# Patient Record
Sex: FEMALE | Race: WHITE | Employment: UNEMPLOYED | URBAN - METROPOLITAN AREA
[De-identification: names, ages, dates, MRNs, and addresses within clinical notes are randomized per-mention and may not be internally consistent; named-entity substitution may affect disease eponyms.]

---

## 2018-02-26 PROBLEM — D64.9 LOW HEMOGLOBIN: Status: ACTIVE | Noted: 2018-02-26

## 2018-03-19 ENCOUNTER — OFFICE VISIT (OUTPATIENT)
Dept: FAMILY MEDICINE CLINIC | Age: 23
End: 2018-03-19
Payer: MEDICAID

## 2018-03-19 VITALS
OXYGEN SATURATION: 99 % | BODY MASS INDEX: 27.46 KG/M2 | WEIGHT: 155 LBS | RESPIRATION RATE: 16 BRPM | SYSTOLIC BLOOD PRESSURE: 131 MMHG | DIASTOLIC BLOOD PRESSURE: 78 MMHG | HEART RATE: 62 BPM | HEIGHT: 63 IN

## 2018-03-19 DIAGNOSIS — J32.9 CHRONIC SINUSITIS, UNSPECIFIED LOCATION: ICD-10-CM

## 2018-03-19 PROBLEM — D64.9 LOW HEMOGLOBIN: Status: RESOLVED | Noted: 2018-02-26 | Resolved: 2018-03-19

## 2018-03-19 PROCEDURE — 1036F TOBACCO NON-USER: CPT | Performed by: FAMILY MEDICINE

## 2018-03-19 PROCEDURE — G8484 FLU IMMUNIZE NO ADMIN: HCPCS | Performed by: FAMILY MEDICINE

## 2018-03-19 PROCEDURE — G8419 CALC BMI OUT NRM PARAM NOF/U: HCPCS | Performed by: FAMILY MEDICINE

## 2018-03-19 PROCEDURE — 99213 OFFICE O/P EST LOW 20 MIN: CPT | Performed by: FAMILY MEDICINE

## 2018-03-19 PROCEDURE — G8427 DOCREV CUR MEDS BY ELIG CLIN: HCPCS | Performed by: FAMILY MEDICINE

## 2018-03-19 RX ORDER — AZELASTINE 1 MG/ML
2 SPRAY, METERED NASAL 2 TIMES DAILY
Qty: 1 BOTTLE | Refills: 3 | Status: SHIPPED | OUTPATIENT
Start: 2018-03-19 | End: 2019-03-02 | Stop reason: ALTCHOICE

## 2018-03-19 RX ORDER — LORATADINE 10 MG/1
10 CAPSULE, LIQUID FILLED ORAL DAILY
Qty: 30 CAPSULE | Refills: 3 | Status: SHIPPED | OUTPATIENT
Start: 2018-03-19 | End: 2019-03-02 | Stop reason: ALTCHOICE

## 2018-03-19 RX ORDER — FLUTICASONE PROPIONATE 50 MCG
1 SPRAY, SUSPENSION (ML) NASAL DAILY
Qty: 1 BOTTLE | Refills: 3 | Status: CANCELLED | OUTPATIENT
Start: 2018-03-19

## 2018-03-19 ASSESSMENT — ENCOUNTER SYMPTOMS
SHORTNESS OF BREATH: 0
COUGH: 1
ABDOMINAL PAIN: 0
BLURRED VISION: 0
DIARRHEA: 0
HEARTBURN: 0
SINUS PAIN: 1
VOMITING: 0

## 2018-03-19 ASSESSMENT — PATIENT HEALTH QUESTIONNAIRE - PHQ9
SUM OF ALL RESPONSES TO PHQ QUESTIONS 1-9: 0
1. LITTLE INTEREST OR PLEASURE IN DOING THINGS: 0
2. FEELING DOWN, DEPRESSED OR HOPELESS: 0
SUM OF ALL RESPONSES TO PHQ9 QUESTIONS 1 & 2: 0

## 2018-03-19 NOTE — PROGRESS NOTES
SUBJECTIVE:        Patient ID: Mega Vallejo is a 23 y.o. female who presents for   Chief Complaint   Patient presents with    Results    Sinus Problem       Follow up on results/Chronic Sinus Infections  Discussed with pt CT sinus, Patient reported that she has an appointment with ENT in mid May for follow-up  Levaquin for 10 days, she added that she Felt better for 2 week, although she continued to have drainage  Over the last couple of days, patient reported that she has some dry cough, congestion of her sinuses and feels drainage on the back of her throat , her nose starts running too  No ear pain, no drainage  No chest pain, no abdominal pain  Allergy medication, takes Claritin daily - at night  No fever, chills, N/V/D    Review of Systems   Constitutional: Negative for chills and fever. HENT: Positive for congestion and sinus pain. Negative for ear discharge and ear pain. Eyes: Negative for blurred vision. Respiratory: Positive for cough. Negative for shortness of breath. Cardiovascular: Negative for chest pain. Gastrointestinal: Negative for abdominal pain, diarrhea, heartburn and vomiting. Skin: Negative for rash. The patient has no complaints, no CP, SOB, N/V/D, headache, dizziness, or vision change. History reviewed. No pertinent past medical history. Patient Active Problem List   Diagnosis    Depression    Anxiety    Chronic sinusitis       History reviewed. No pertinent surgical history. History reviewed. No pertinent past medical history. History reviewed. No pertinent family history.     Social History     Social History    Marital status: Single     Spouse name: N/A    Number of children: N/A    Years of education: N/A     Social History Main Topics    Smoking status: Never Smoker    Smokeless tobacco: Never Used    Alcohol use No    Drug use: No    Sexual activity: Yes     Partners: Male     Other Topics Concern    None     Social History Narrative schedule as soon as possible if overdue, as this is important in assessing for undiagnosed pathology, especially cancer, as well as protecting against potentially harmful/life threatening disease. Refills as needed    Avani received counseling on the following healthy behaviors: medication adherence    Discussed any signs and symptoms that would warrant immediate ED evaluation    Avani was instructed to call if any new symptoms develop prior to next visit.          F/U as instructed    Parvin Connors M.D  PGY-2  Family Medicine

## 2018-04-08 ENCOUNTER — OFFICE VISIT (OUTPATIENT)
Dept: FAMILY MEDICINE CLINIC | Age: 23
End: 2018-04-08
Payer: MEDICAID

## 2018-04-08 VITALS
BODY MASS INDEX: 27.82 KG/M2 | WEIGHT: 157 LBS | HEART RATE: 95 BPM | SYSTOLIC BLOOD PRESSURE: 108 MMHG | TEMPERATURE: 98.8 F | OXYGEN SATURATION: 99 % | RESPIRATION RATE: 18 BRPM | DIASTOLIC BLOOD PRESSURE: 70 MMHG | HEIGHT: 63 IN

## 2018-04-08 DIAGNOSIS — J01.40 ACUTE NON-RECURRENT PANSINUSITIS: Primary | ICD-10-CM

## 2018-04-08 PROCEDURE — 99213 OFFICE O/P EST LOW 20 MIN: CPT | Performed by: PHYSICIAN ASSISTANT

## 2018-04-08 PROCEDURE — G8419 CALC BMI OUT NRM PARAM NOF/U: HCPCS | Performed by: PHYSICIAN ASSISTANT

## 2018-04-08 PROCEDURE — G8427 DOCREV CUR MEDS BY ELIG CLIN: HCPCS | Performed by: PHYSICIAN ASSISTANT

## 2018-04-08 PROCEDURE — 1036F TOBACCO NON-USER: CPT | Performed by: PHYSICIAN ASSISTANT

## 2018-04-08 RX ORDER — BROMPHENIRAMINE MALEATE, PSEUDOEPHEDRINE HYDROCHLORIDE, AND DEXTROMETHORPHAN HYDROBROMIDE 2; 30; 10 MG/5ML; MG/5ML; MG/5ML
10 SYRUP ORAL 4 TIMES DAILY PRN
Qty: 120 ML | Refills: 0 | Status: SHIPPED | OUTPATIENT
Start: 2018-04-08 | End: 2018-07-03 | Stop reason: ALTCHOICE

## 2018-04-08 RX ORDER — AMOXICILLIN AND CLAVULANATE POTASSIUM 875; 125 MG/1; MG/1
1 TABLET, FILM COATED ORAL 2 TIMES DAILY
Qty: 20 TABLET | Refills: 0 | Status: SHIPPED | OUTPATIENT
Start: 2018-04-08 | End: 2018-04-13 | Stop reason: ALTCHOICE

## 2018-04-09 ENCOUNTER — TELEPHONE (OUTPATIENT)
Dept: FAMILY MEDICINE CLINIC | Age: 23
End: 2018-04-09

## 2018-04-10 ENCOUNTER — TELEPHONE (OUTPATIENT)
Dept: FAMILY MEDICINE CLINIC | Age: 23
End: 2018-04-10

## 2018-04-11 RX ORDER — FLUCONAZOLE 150 MG/1
150 TABLET ORAL ONCE
Qty: 1 TABLET | Refills: 1 | Status: SHIPPED | OUTPATIENT
Start: 2018-04-11 | End: 2018-04-11

## 2018-04-13 ENCOUNTER — OFFICE VISIT (OUTPATIENT)
Dept: FAMILY MEDICINE CLINIC | Age: 23
End: 2018-04-13
Payer: MEDICAID

## 2018-04-13 VITALS
WEIGHT: 159 LBS | HEIGHT: 63 IN | BODY MASS INDEX: 28.17 KG/M2 | HEART RATE: 65 BPM | RESPIRATION RATE: 16 BRPM | SYSTOLIC BLOOD PRESSURE: 119 MMHG | DIASTOLIC BLOOD PRESSURE: 81 MMHG | TEMPERATURE: 98.3 F

## 2018-04-13 DIAGNOSIS — J01.91 ACUTE RECURRENT SINUSITIS, UNSPECIFIED LOCATION: Primary | ICD-10-CM

## 2018-04-13 PROCEDURE — 1036F TOBACCO NON-USER: CPT | Performed by: FAMILY MEDICINE

## 2018-04-13 PROCEDURE — G8427 DOCREV CUR MEDS BY ELIG CLIN: HCPCS | Performed by: FAMILY MEDICINE

## 2018-04-13 PROCEDURE — 99213 OFFICE O/P EST LOW 20 MIN: CPT | Performed by: FAMILY MEDICINE

## 2018-04-13 PROCEDURE — G8419 CALC BMI OUT NRM PARAM NOF/U: HCPCS | Performed by: FAMILY MEDICINE

## 2018-04-13 RX ORDER — IBUPROFEN 800 MG/1
TABLET ORAL
Refills: 2 | COMMUNITY
Start: 2018-03-19 | End: 2019-03-02 | Stop reason: ALTCHOICE

## 2018-04-13 RX ORDER — FLUCONAZOLE 150 MG/1
150 TABLET ORAL ONCE
Qty: 2 TABLET | Refills: 0 | Status: SHIPPED | OUTPATIENT
Start: 2018-04-13 | End: 2018-04-13

## 2018-04-13 RX ORDER — MOXIFLOXACIN HYDROCHLORIDE 400 MG/1
400 TABLET ORAL DAILY
Qty: 14 TABLET | Refills: 0 | Status: SHIPPED | OUTPATIENT
Start: 2018-04-13 | End: 2018-04-14 | Stop reason: ALTCHOICE

## 2018-04-13 ASSESSMENT — ENCOUNTER SYMPTOMS
HEARTBURN: 0
SHORTNESS OF BREATH: 0
VOMITING: 0
NAUSEA: 0
COUGH: 1
SPUTUM PRODUCTION: 0
EYE PAIN: 1
ABDOMINAL PAIN: 0
SORE THROAT: 1
SINUS PAIN: 1
EYE DISCHARGE: 1
DIARRHEA: 0

## 2018-04-14 DIAGNOSIS — J01.91 ACUTE RECURRENT SINUSITIS, UNSPECIFIED LOCATION: Primary | ICD-10-CM

## 2018-04-14 RX ORDER — CIPROFLOXACIN 500 MG/1
500 TABLET, FILM COATED ORAL 2 TIMES DAILY
Qty: 14 TABLET | Refills: 0 | Status: SHIPPED | OUTPATIENT
Start: 2018-04-14 | End: 2018-04-24

## 2018-04-30 ENCOUNTER — OFFICE VISIT (OUTPATIENT)
Dept: FAMILY MEDICINE CLINIC | Age: 23
End: 2018-04-30
Payer: MEDICAID

## 2018-04-30 VITALS
BODY MASS INDEX: 28.17 KG/M2 | WEIGHT: 159 LBS | SYSTOLIC BLOOD PRESSURE: 122 MMHG | OXYGEN SATURATION: 99 % | TEMPERATURE: 98.3 F | HEART RATE: 66 BPM | HEIGHT: 63 IN | DIASTOLIC BLOOD PRESSURE: 80 MMHG

## 2018-04-30 DIAGNOSIS — J01.90 ACUTE SINUSITIS, RECURRENCE NOT SPECIFIED, UNSPECIFIED LOCATION: Primary | ICD-10-CM

## 2018-04-30 LAB — S PYO AG THROAT QL: NORMAL

## 2018-04-30 PROCEDURE — 99213 OFFICE O/P EST LOW 20 MIN: CPT | Performed by: PHYSICIAN ASSISTANT

## 2018-04-30 PROCEDURE — G8419 CALC BMI OUT NRM PARAM NOF/U: HCPCS | Performed by: PHYSICIAN ASSISTANT

## 2018-04-30 PROCEDURE — 1036F TOBACCO NON-USER: CPT | Performed by: PHYSICIAN ASSISTANT

## 2018-04-30 PROCEDURE — G8427 DOCREV CUR MEDS BY ELIG CLIN: HCPCS | Performed by: PHYSICIAN ASSISTANT

## 2018-04-30 PROCEDURE — 87880 STREP A ASSAY W/OPTIC: CPT | Performed by: PHYSICIAN ASSISTANT

## 2018-04-30 RX ORDER — LORATADINE 10 MG/1
10 TABLET ORAL DAILY
Qty: 10 TABLET | Refills: 0 | Status: SHIPPED | OUTPATIENT
Start: 2018-04-30 | End: 2018-05-10

## 2018-04-30 RX ORDER — CEFDINIR 300 MG/1
300 CAPSULE ORAL 2 TIMES DAILY
Qty: 20 CAPSULE | Refills: 0 | Status: SHIPPED | OUTPATIENT
Start: 2018-04-30 | End: 2018-05-10

## 2018-04-30 RX ORDER — METHYLPREDNISOLONE 4 MG/1
TABLET ORAL
Qty: 1 KIT | Refills: 0 | Status: SHIPPED | OUTPATIENT
Start: 2018-04-30 | End: 2018-05-06

## 2018-05-02 ENCOUNTER — TELEPHONE (OUTPATIENT)
Dept: FAMILY MEDICINE CLINIC | Age: 23
End: 2018-05-02

## 2018-05-11 ENCOUNTER — OFFICE VISIT (OUTPATIENT)
Dept: ENT CLINIC | Age: 23
End: 2018-05-11
Payer: MEDICAID

## 2018-05-11 VITALS
HEIGHT: 63 IN | SYSTOLIC BLOOD PRESSURE: 119 MMHG | HEART RATE: 66 BPM | BODY MASS INDEX: 25.69 KG/M2 | TEMPERATURE: 99 F | WEIGHT: 145 LBS | DIASTOLIC BLOOD PRESSURE: 71 MMHG | OXYGEN SATURATION: 99 %

## 2018-05-11 DIAGNOSIS — J30.1 CHRONIC SEASONAL ALLERGIC RHINITIS DUE TO POLLEN: ICD-10-CM

## 2018-05-11 DIAGNOSIS — J32.9 CHRONIC SINUSITIS, UNSPECIFIED LOCATION: Primary | ICD-10-CM

## 2018-05-11 PROCEDURE — G8419 CALC BMI OUT NRM PARAM NOF/U: HCPCS | Performed by: OTOLARYNGOLOGY

## 2018-05-11 PROCEDURE — 1036F TOBACCO NON-USER: CPT | Performed by: OTOLARYNGOLOGY

## 2018-05-11 PROCEDURE — 99204 OFFICE O/P NEW MOD 45 MIN: CPT | Performed by: OTOLARYNGOLOGY

## 2018-05-11 PROCEDURE — G8427 DOCREV CUR MEDS BY ELIG CLIN: HCPCS | Performed by: OTOLARYNGOLOGY

## 2018-05-11 ASSESSMENT — ENCOUNTER SYMPTOMS
SHORTNESS OF BREATH: 0
ABDOMINAL PAIN: 0
COLOR CHANGE: 0
GASTROINTESTINAL NEGATIVE: 1
SINUS PRESSURE: 1
RESPIRATORY NEGATIVE: 1
EYES NEGATIVE: 1
RHINORRHEA: 1

## 2018-05-15 ENCOUNTER — TELEPHONE (OUTPATIENT)
Dept: ENT CLINIC | Age: 23
End: 2018-05-15

## 2018-06-26 ENCOUNTER — TELEPHONE (OUTPATIENT)
Dept: ENT CLINIC | Age: 23
End: 2018-06-26

## 2018-07-03 NOTE — PROGRESS NOTES
[facility] PRE-ADMISSION TESTING INSTRUCTIONS    The Preadmission Testing patient is instructed accordingly using the following criteria (check applicable):    ARRIVAL INSTRUCTIONS:  [x] Parking the day of Surgery is located in the Main Entrance lot. Upon entering the door, make an immediate right to the surgery reception desk    [x] Bring photo ID and insurance card    [] Bring in a copy of Living will or Durable Power of  papers. [x] Please be sure to arrange transportation to and from the hospital    [x] Please arrange for someone to be with you the remainder of the day due to having anesthesia      GENERAL INSTRUCTIONS:    [x] Nothing by mouth after midnight, including gum, candy, mints or water    [x] You may brush your teeth, but do not swallow any water    [x] Take medications as instructed with 1-2 oz of water    [x] Stop herbal supplements and vitamins 5 days prior to procedure    [x] Follow preop dosing of blood thinners per physician instructions    [] Do not take insulin or oral diabetic medications    [] If diabetic and have low blood sugar or feel symptomatic, take 1-2oz apple juice or glucose tablets    [] Bring inhalers day of surgery    [] Bring C-PAP/ Bi-Pap day of surgery    [x] Bring urine specimen day of surgery    [x] Antibacterial Soap shower or bath AM of Surgery, no lotion, powders or creams to surgical site    [] Follow bowel prep as instructed per surgeon    [x] No tobacco products within 24 hours of surgery     [x] No alcohol or illegal drug use within 24 hours of surgery.     [x] Jewelry, body piercing's, eyeglasses, contact lenses and dentures are not permitted into surgery (bring cases)      [x] Please do not wear any nail polish or make up on the day of surgery    [x] If not already done, you can expect a call from registration    [x] If surgeon requests a time change you will be notified the day prior to surgery    [x] If you receive a survey

## 2018-07-16 ENCOUNTER — ANESTHESIA (OUTPATIENT)
Dept: OPERATING ROOM | Age: 23
End: 2018-07-16
Payer: MEDICAID

## 2018-07-16 ENCOUNTER — ANESTHESIA EVENT (OUTPATIENT)
Dept: OPERATING ROOM | Age: 23
End: 2018-07-16
Payer: MEDICAID

## 2018-07-16 ENCOUNTER — HOSPITAL ENCOUNTER (OUTPATIENT)
Age: 23
Setting detail: OUTPATIENT SURGERY
Discharge: HOME | End: 2018-07-16
Attending: OTOLARYNGOLOGY | Admitting: OTOLARYNGOLOGY
Payer: MEDICAID

## 2018-07-16 VITALS
HEART RATE: 60 BPM | BODY MASS INDEX: 26.58 KG/M2 | TEMPERATURE: 97.4 F | DIASTOLIC BLOOD PRESSURE: 78 MMHG | HEIGHT: 63 IN | SYSTOLIC BLOOD PRESSURE: 124 MMHG | WEIGHT: 150 LBS | RESPIRATION RATE: 20 BRPM | OXYGEN SATURATION: 94 %

## 2018-07-16 VITALS
RESPIRATION RATE: 1 BRPM | TEMPERATURE: 92.5 F | DIASTOLIC BLOOD PRESSURE: 63 MMHG | SYSTOLIC BLOOD PRESSURE: 116 MMHG | OXYGEN SATURATION: 100 %

## 2018-07-16 DIAGNOSIS — J32.9 CHRONIC SINUSITIS, UNSPECIFIED LOCATION: Primary | ICD-10-CM

## 2018-07-16 LAB — HCG(URINE) PREGNANCY TEST: NEGATIVE

## 2018-07-16 PROCEDURE — 3700000001 HC ADD 15 MINUTES (ANESTHESIA): Performed by: OTOLARYNGOLOGY

## 2018-07-16 PROCEDURE — 6370000000 HC RX 637 (ALT 250 FOR IP): Performed by: ANESTHESIOLOGY

## 2018-07-16 PROCEDURE — 7100000000 HC PACU RECOVERY - FIRST 15 MIN: Performed by: OTOLARYNGOLOGY

## 2018-07-16 PROCEDURE — 3700000000 HC ANESTHESIA ATTENDED CARE: Performed by: OTOLARYNGOLOGY

## 2018-07-16 PROCEDURE — 2580000003 HC RX 258: Performed by: NURSE ANESTHETIST, CERTIFIED REGISTERED

## 2018-07-16 PROCEDURE — 2709999900 HC NON-CHARGEABLE SUPPLY: Performed by: OTOLARYNGOLOGY

## 2018-07-16 PROCEDURE — C2625 STENT, NON-COR, TEM W/DEL SY: HCPCS | Performed by: OTOLARYNGOLOGY

## 2018-07-16 PROCEDURE — 6370000000 HC RX 637 (ALT 250 FOR IP): Performed by: OTOLARYNGOLOGY

## 2018-07-16 PROCEDURE — 30140 RESECT INFERIOR TURBINATE: CPT | Performed by: OTOLARYNGOLOGY

## 2018-07-16 PROCEDURE — 6360000002 HC RX W HCPCS: Performed by: NURSE ANESTHETIST, CERTIFIED REGISTERED

## 2018-07-16 PROCEDURE — 31295 NSL/SINS NDSC SURG MAX SINS: CPT | Performed by: OTOLARYNGOLOGY

## 2018-07-16 PROCEDURE — 3600000003 HC SURGERY LEVEL 3 BASE: Performed by: OTOLARYNGOLOGY

## 2018-07-16 PROCEDURE — 2580000003 HC RX 258: Performed by: ANESTHESIOLOGY

## 2018-07-16 PROCEDURE — 3600000013 HC SURGERY LEVEL 3 ADDTL 15MIN: Performed by: OTOLARYNGOLOGY

## 2018-07-16 PROCEDURE — 7100000010 HC PHASE II RECOVERY - FIRST 15 MIN: Performed by: OTOLARYNGOLOGY

## 2018-07-16 PROCEDURE — 2720000010 HC SURG SUPPLY STERILE: Performed by: OTOLARYNGOLOGY

## 2018-07-16 PROCEDURE — 2500000003 HC RX 250 WO HCPCS: Performed by: NURSE ANESTHETIST, CERTIFIED REGISTERED

## 2018-07-16 PROCEDURE — 6360000002 HC RX W HCPCS: Performed by: ANESTHESIOLOGY

## 2018-07-16 PROCEDURE — 2500000003 HC RX 250 WO HCPCS: Performed by: OTOLARYNGOLOGY

## 2018-07-16 PROCEDURE — 7100000001 HC PACU RECOVERY - ADDTL 15 MIN: Performed by: OTOLARYNGOLOGY

## 2018-07-16 PROCEDURE — 81025 URINE PREGNANCY TEST: CPT

## 2018-07-16 PROCEDURE — C1776 JOINT DEVICE (IMPLANTABLE): HCPCS | Performed by: OTOLARYNGOLOGY

## 2018-07-16 PROCEDURE — 7100000011 HC PHASE II RECOVERY - ADDTL 15 MIN: Performed by: OTOLARYNGOLOGY

## 2018-07-16 PROCEDURE — C1726 CATH, BAL DIL, NON-VASCULAR: HCPCS | Performed by: OTOLARYNGOLOGY

## 2018-07-16 PROCEDURE — 31298 NSL/SINS NDSC SURG FRNT&SPHN: CPT | Performed by: OTOLARYNGOLOGY

## 2018-07-16 DEVICE — PROPEL CONTOUR SINUS IMPLANT
Type: IMPLANTABLE DEVICE | Site: NOSE | Status: FUNCTIONAL
Brand: PROPEL CONTOUR

## 2018-07-16 DEVICE — PROPEL MINI SINUS IMPLANT
Type: IMPLANTABLE DEVICE | Site: NOSE | Status: FUNCTIONAL
Brand: PROPEL MINI

## 2018-07-16 DEVICE — NASASTENT
Type: IMPLANTABLE DEVICE | Site: NOSE | Status: FUNCTIONAL
Brand: RAPID RHINO

## 2018-07-16 RX ORDER — ONDANSETRON 2 MG/ML
4 INJECTION INTRAMUSCULAR; INTRAVENOUS
Status: DISCONTINUED | OUTPATIENT
Start: 2018-07-16 | End: 2018-07-16 | Stop reason: HOSPADM

## 2018-07-16 RX ORDER — ONDANSETRON 2 MG/ML
INJECTION INTRAMUSCULAR; INTRAVENOUS PRN
Status: DISCONTINUED | OUTPATIENT
Start: 2018-07-16 | End: 2018-07-16 | Stop reason: SDUPTHER

## 2018-07-16 RX ORDER — HYDROCODONE BITARTRATE AND ACETAMINOPHEN 5; 325 MG/1; MG/1
1 TABLET ORAL EVERY 6 HOURS PRN
Qty: 20 TABLET | Refills: 0 | Status: SHIPPED | OUTPATIENT
Start: 2018-07-16 | End: 2018-07-21

## 2018-07-16 RX ORDER — DEXAMETHASONE SODIUM PHOSPHATE 4 MG/ML
INJECTION, SOLUTION INTRA-ARTICULAR; INTRALESIONAL; INTRAMUSCULAR; INTRAVENOUS; SOFT TISSUE PRN
Status: DISCONTINUED | OUTPATIENT
Start: 2018-07-16 | End: 2018-07-16 | Stop reason: SDUPTHER

## 2018-07-16 RX ORDER — SODIUM CHLORIDE 0.9 % (FLUSH) 0.9 %
10 SYRINGE (ML) INJECTION EVERY 12 HOURS SCHEDULED
Status: DISCONTINUED | OUTPATIENT
Start: 2018-07-16 | End: 2018-07-16 | Stop reason: HOSPADM

## 2018-07-16 RX ORDER — LIDOCAINE HYDROCHLORIDE 20 MG/ML
INJECTION, SOLUTION INFILTRATION; PERINEURAL PRN
Status: DISCONTINUED | OUTPATIENT
Start: 2018-07-16 | End: 2018-07-16 | Stop reason: SDUPTHER

## 2018-07-16 RX ORDER — OXYMETAZOLINE HYDROCHLORIDE 0.05 G/100ML
2 SPRAY NASAL PRN
Status: COMPLETED | OUTPATIENT
Start: 2018-07-16 | End: 2018-07-16

## 2018-07-16 RX ORDER — ROCURONIUM BROMIDE 10 MG/ML
INJECTION, SOLUTION INTRAVENOUS PRN
Status: DISCONTINUED | OUTPATIENT
Start: 2018-07-16 | End: 2018-07-16 | Stop reason: SDUPTHER

## 2018-07-16 RX ORDER — FENTANYL CITRATE 50 UG/ML
INJECTION, SOLUTION INTRAMUSCULAR; INTRAVENOUS PRN
Status: DISCONTINUED | OUTPATIENT
Start: 2018-07-16 | End: 2018-07-16 | Stop reason: SDUPTHER

## 2018-07-16 RX ORDER — SODIUM CHLORIDE 0.9 % (FLUSH) 0.9 %
10 SYRINGE (ML) INJECTION PRN
Status: DISCONTINUED | OUTPATIENT
Start: 2018-07-16 | End: 2018-07-16 | Stop reason: HOSPADM

## 2018-07-16 RX ORDER — HYDROCODONE BITARTRATE AND ACETAMINOPHEN 5; 325 MG/1; MG/1
1 TABLET ORAL ONCE
Status: COMPLETED | OUTPATIENT
Start: 2018-07-16 | End: 2018-07-16

## 2018-07-16 RX ORDER — SODIUM CHLORIDE 9 MG/ML
INJECTION, SOLUTION INTRAVENOUS CONTINUOUS
Status: DISCONTINUED | OUTPATIENT
Start: 2018-07-16 | End: 2018-07-16 | Stop reason: HOSPADM

## 2018-07-16 RX ORDER — MIDAZOLAM HYDROCHLORIDE 1 MG/ML
INJECTION INTRAMUSCULAR; INTRAVENOUS PRN
Status: DISCONTINUED | OUTPATIENT
Start: 2018-07-16 | End: 2018-07-16 | Stop reason: SDUPTHER

## 2018-07-16 RX ORDER — SODIUM CHLORIDE 9 MG/ML
INJECTION, SOLUTION INTRAVENOUS CONTINUOUS PRN
Status: DISCONTINUED | OUTPATIENT
Start: 2018-07-16 | End: 2018-07-16 | Stop reason: SDUPTHER

## 2018-07-16 RX ORDER — GLYCOPYRROLATE 0.2 MG/ML
INJECTION INTRAMUSCULAR; INTRAVENOUS PRN
Status: DISCONTINUED | OUTPATIENT
Start: 2018-07-16 | End: 2018-07-16 | Stop reason: SDUPTHER

## 2018-07-16 RX ORDER — PROPOFOL 10 MG/ML
INJECTION, EMULSION INTRAVENOUS PRN
Status: DISCONTINUED | OUTPATIENT
Start: 2018-07-16 | End: 2018-07-16 | Stop reason: SDUPTHER

## 2018-07-16 RX ORDER — LIDOCAINE HYDROCHLORIDE AND EPINEPHRINE 10; 10 MG/ML; UG/ML
INJECTION, SOLUTION INFILTRATION; PERINEURAL PRN
Status: DISCONTINUED | OUTPATIENT
Start: 2018-07-16 | End: 2018-07-16 | Stop reason: HOSPADM

## 2018-07-16 RX ORDER — OXYMETAZOLINE HYDROCHLORIDE 0.05 G/100ML
SPRAY NASAL PRN
Status: DISCONTINUED | OUTPATIENT
Start: 2018-07-16 | End: 2018-07-16 | Stop reason: HOSPADM

## 2018-07-16 RX ADMIN — NEOSTIGMINE METHYLSULFATE 3 MG: 0.5 INJECTION, SOLUTION INTRAMUSCULAR; INTRAVENOUS; SUBCUTANEOUS at 10:17

## 2018-07-16 RX ADMIN — LIDOCAINE HYDROCHLORIDE 50 MG: 20 INJECTION, SOLUTION INFILTRATION; PERINEURAL at 08:50

## 2018-07-16 RX ADMIN — FENTANYL CITRATE 50 MCG: 50 INJECTION, SOLUTION INTRAMUSCULAR; INTRAVENOUS at 08:38

## 2018-07-16 RX ADMIN — LIDOCAINE HYDROCHLORIDE 50 MG: 20 INJECTION, SOLUTION INFILTRATION; PERINEURAL at 08:57

## 2018-07-16 RX ADMIN — FENTANYL CITRATE 50 MCG: 50 INJECTION, SOLUTION INTRAMUSCULAR; INTRAVENOUS at 10:17

## 2018-07-16 RX ADMIN — PROPOFOL 200 MG: 10 INJECTION, EMULSION INTRAVENOUS at 08:50

## 2018-07-16 RX ADMIN — DEXAMETHASONE SODIUM PHOSPHATE 8 MG: 4 INJECTION, SOLUTION INTRA-ARTICULAR; INTRALESIONAL; INTRAMUSCULAR; INTRAVENOUS; SOFT TISSUE at 08:57

## 2018-07-16 RX ADMIN — SODIUM CHLORIDE: 9 INJECTION, SOLUTION INTRAVENOUS at 09:13

## 2018-07-16 RX ADMIN — HYDROMORPHONE HYDROCHLORIDE 0.5 MG: 1 INJECTION, SOLUTION INTRAMUSCULAR; INTRAVENOUS; SUBCUTANEOUS at 10:55

## 2018-07-16 RX ADMIN — Medication 0.6 MG: at 10:17

## 2018-07-16 RX ADMIN — ROCURONIUM BROMIDE 35 MG: 10 SOLUTION INTRAVENOUS at 08:50

## 2018-07-16 RX ADMIN — ONDANSETRON HYDROCHLORIDE 4 MG: 2 INJECTION, SOLUTION INTRAMUSCULAR; INTRAVENOUS at 08:57

## 2018-07-16 RX ADMIN — SODIUM CHLORIDE: 9 INJECTION, SOLUTION INTRAVENOUS at 08:38

## 2018-07-16 RX ADMIN — OXYMETAZOLINE HYDROCHLORIDE 2 SPRAY: 5 SPRAY NASAL at 08:12

## 2018-07-16 RX ADMIN — SODIUM CHLORIDE: 9 INJECTION, SOLUTION INTRAVENOUS at 07:38

## 2018-07-16 RX ADMIN — OXYMETAZOLINE HYDROCHLORIDE 2 SPRAY: 5 SPRAY NASAL at 07:54

## 2018-07-16 RX ADMIN — HYDROCODONE BITARTRATE AND ACETAMINOPHEN 1 TABLET: 5; 325 TABLET ORAL at 11:34

## 2018-07-16 RX ADMIN — Medication 0.1 MG: at 08:38

## 2018-07-16 RX ADMIN — OXYMETAZOLINE HYDROCHLORIDE 2 SPRAY: 5 SPRAY NASAL at 07:39

## 2018-07-16 RX ADMIN — MIDAZOLAM HYDROCHLORIDE 2 MG: 1 INJECTION, SOLUTION INTRAMUSCULAR; INTRAVENOUS at 08:38

## 2018-07-16 ASSESSMENT — PULMONARY FUNCTION TESTS
PIF_VALUE: 20
PIF_VALUE: 20
PIF_VALUE: 21
PIF_VALUE: 20
PIF_VALUE: 33
PIF_VALUE: 20
PIF_VALUE: 0
PIF_VALUE: 20
PIF_VALUE: 21
PIF_VALUE: 20
PIF_VALUE: 20
PIF_VALUE: 21
PIF_VALUE: 17
PIF_VALUE: 20
PIF_VALUE: 20
PIF_VALUE: 21
PIF_VALUE: 23
PIF_VALUE: 20
PIF_VALUE: 21
PIF_VALUE: 21
PIF_VALUE: 0
PIF_VALUE: 21
PIF_VALUE: 21
PIF_VALUE: 18
PIF_VALUE: 20
PIF_VALUE: 20
PIF_VALUE: 7
PIF_VALUE: 21
PIF_VALUE: 21
PIF_VALUE: 23
PIF_VALUE: 20
PIF_VALUE: 21
PIF_VALUE: 20
PIF_VALUE: 6
PIF_VALUE: 20
PIF_VALUE: 19
PIF_VALUE: 27
PIF_VALUE: 20
PIF_VALUE: 16
PIF_VALUE: 20
PIF_VALUE: 21
PIF_VALUE: 21
PIF_VALUE: 15
PIF_VALUE: 20
PIF_VALUE: 19
PIF_VALUE: 21
PIF_VALUE: 20
PIF_VALUE: 21
PIF_VALUE: 15
PIF_VALUE: 21
PIF_VALUE: 0
PIF_VALUE: 18
PIF_VALUE: 26
PIF_VALUE: 14
PIF_VALUE: 8
PIF_VALUE: 22
PIF_VALUE: 20
PIF_VALUE: 21
PIF_VALUE: 0
PIF_VALUE: 23
PIF_VALUE: 3
PIF_VALUE: 20
PIF_VALUE: 21
PIF_VALUE: 20
PIF_VALUE: 21
PIF_VALUE: 15
PIF_VALUE: 18
PIF_VALUE: 19
PIF_VALUE: 1
PIF_VALUE: 18
PIF_VALUE: 20
PIF_VALUE: 21
PIF_VALUE: 15
PIF_VALUE: 1
PIF_VALUE: 20
PIF_VALUE: 1
PIF_VALUE: 21
PIF_VALUE: 21

## 2018-07-16 ASSESSMENT — PAIN DESCRIPTION - DESCRIPTORS
DESCRIPTORS: ACHING;PRESSURE

## 2018-07-16 ASSESSMENT — PAIN DESCRIPTION - ONSET
ONSET: ON-GOING

## 2018-07-16 ASSESSMENT — PAIN DESCRIPTION - PAIN TYPE
TYPE: SURGICAL PAIN

## 2018-07-16 ASSESSMENT — PAIN DESCRIPTION - ORIENTATION
ORIENTATION: INNER
ORIENTATION: INNER

## 2018-07-16 ASSESSMENT — PAIN DESCRIPTION - LOCATION
LOCATION: NOSE;HEAD
LOCATION: HEAD;NOSE
LOCATION: HEAD;NOSE

## 2018-07-16 ASSESSMENT — PAIN SCALES - GENERAL
PAINLEVEL_OUTOF10: 5
PAINLEVEL_OUTOF10: 5
PAINLEVEL_OUTOF10: 8
PAINLEVEL_OUTOF10: 8

## 2018-07-16 ASSESSMENT — PAIN DESCRIPTION - FREQUENCY: FREQUENCY: CONTINUOUS

## 2018-07-16 ASSESSMENT — PAIN - FUNCTIONAL ASSESSMENT: PAIN_FUNCTIONAL_ASSESSMENT: 0-10

## 2018-07-16 NOTE — H&P
Subjective:      Patient ID:  Christel Fraire is a 20 y.o. female.     HPI:     Sinusitis  Patient presents with chronic sinusitis for 1 year. Her symptoms include nasal congestion, clear rhinorrhea, frequent clearing of the throat, headaches, facial pain. Is the condition interfering with work? yes              How: Pt tolerates symptoms at work     The patient takes antibiotics for sinusitis 4 times per  year. The patient has not had sinus surgery in the past.      Prior antibiotic therapy has included Amoxicillin, Augmentin, Azithromycin. For how lon month     Other medications have included Flonase, Claritin               For how long: 3 month(s)               Relief: inadequate relief of symptoms.     she has not had allergy testing which was not done. Immunotherapy has never been tried.     The patient has never had nasal polyps. The patient has no history of asthma. The patient has no history of eczema.       Sleep study: no  JENNIFER: no  CPAP: no     Sinus lavage: no              Relief: no relief of symptoms.     Headaches/Facial Pain: yes              Where: bilateral-  maxillary     Nasal obstruction: no              Side: bilateral     The patients worst time of year is none     Patient's medications, allergies, past medical, surgical, social and family histories were reviewed and updated as appropriate.        Review of Systems   Constitutional: Negative. Negative for fever. HENT: Positive for congestion, postnasal drip, rhinorrhea, sinus pressure and sneezing. Eyes: Negative. Negative for visual disturbance. Respiratory: Negative. Negative for shortness of breath. Cardiovascular: Negative. Negative for chest pain. Gastrointestinal: Negative. Negative for abdominal pain. Genitourinary: Negative. Musculoskeletal: Negative. Skin: Negative. Negative for color change. Allergic/Immunologic: Positive for environmental allergies. Neurological: Negative.

## 2018-07-16 NOTE — OP NOTE
sphenoid sinus. The 30-degree endoscope was placed back to the area of the supreme turbinate along the posterior nasopharyngeal wall approximately 1 cm above the nasal choanae on the left side was seen the beginning of the sphenoid sinus. This was somewhat atretic and the sinus was maybe 1 to 2 mm at best. Using the [employer] functional sphenoid guide, the guide was placed along the same path as the endoscope and placed right up to the area where the sphenoid sinus could be seen. The lighted guidewire was then placed into the sphenoid sinus with manipulation. Once it was in the sphenoid sinus, a 6-mm balloon was then allowed to pass over the area of the sphenoid sinus until the balloon straddled both sides of the sinus ostium. The balloon was then taken up to 12 mmHg pressure with water. This was allowed to sit for 3 seconds and was then taken down. The resulting ostium after balloon dilation of the sphenoid sinus was very large and allowed for visualization of the sphenoid sinus itself. This area was irrigated out with 180 ml of Saline. Then suction to remove residual irrigation. The endoscope and balloon were then removed    RIGHT:  The 30-degree endoscope was then placed in the patient's right nasal cavity. Moving from a posterior to anterior position, the first sinus that was addressed was the sphenoid sinus. The 30-degree endoscope was placed back to the area of the supreme turbinate along the posterior nasopharyngeal wall approximately 1 cm above the nasal choanae on the left side was seen the beginning of the sphenoid sinus. This was somewhat atretic and the sinus was maybe 1 to 2 mm at best. Using the Ac[employer]arent functional sphenoid guide, the guide was placed along the same path as the endoscope and placed right up to the area where the sphenoid sinus could be seen. The lighted guidewire was then placed into the sphenoid sinus with manipulation.  Once it was in the sphenoid sinus, a 6-mm balloon was then allowed to pass over the area of the sphenoid sinus until the balloon straddled both sides of the sinus ostium. The balloon was then taken up to 12 mmHg pressure with water. This was allowed to sit for 3 seconds and was then taken down. The resulting ostium after balloon dilation of the sphenoid sinus was very large and allowed for visualization of the sphenoid sinus itself. This area was irrigated out with 180 ml of Saline. Then suction to remove residual irrigation. The endoscope and balloon were then removed    Frontals  LEFT:  Attention was then turned to the frontals starting with the left side. Using the Murl Diones, the patient's middle turbinate was medialized until the ethmoid and uncinate process were in full view. Injections were placed at the root of the middle turbinate and approximately 1 to 2 mL were placed in the root of the middle turbinate for anesthesia as well as to control bleeding. The frontal sinus guide from [employer] was then used to find the patient's   frontal sinus. The guidewire was used and I was visually able to pass the guidewire into the patient's frontal sinus on the left side. This was clearly visible as the light was found just under the patient's brow in the area of the frontal sinus. A guidewire was left in place. The balloon was pushed over it until the balloon was straddling the frontal recess. The 6-mm balloon was then used to dilate, being dilated up to 12 mL of water pressure. This was held for 3 seconds and removed. There was a second more inferior   inflation to open up the frontal recess further. Once the frontal sinus was opened, it was visually examined and it was found to be large wide opening. The frontal recess was then irrigated out with 180 mL of normal saline to clear out the frontal sinus. The balloon was then retracted and the frontal sinus seeker was withdrawn. RIGHT:  Attention was then turned to the frontals starting with the left side.  Using the Murl Diones, the patient's middle turbinate was medialized until the ethmoid and uncinate process were in full view. Injections were placed at the root of the middle turbinate and approximately 1 to 2 mL were placed in the root of the middle turbinate for anesthesia as well as to control bleeding. The frontal sinus guide from [employer] was then used to find the patient's   frontal sinus. The guidewire was used and I was visually able to pass the guidewire into the patient's frontal sinus on the left side. This was clearly visible as the light was found just under the patient's brow in the area of the frontal sinus. A guidewire was left in place. The balloon was pushed over it until the balloon was straddling the frontal recess. The 6-mm balloon was then used to dilate, being dilated up to 12 mL of water pressure. This was held for 3 seconds and removed. There was a second more inferior   inflation to open up the frontal recess further. Once the frontal sinus was opened, it was visually examined and it was found to be large wide opening. The frontal recess was then irrigated out with 180 mL of normal saline to clear out the frontal sinus. The balloon was then retracted and the frontal sinus seeker was withdrawn. Maxillary   LEFT:  With the maxillary sinuses, starting on the left side, the patient was not found to have an accessory os. The patient was not also found to have a jocelyne bullosa which was not reduced to allow access into the Osteomeatal complex. The maxillary sinus guidewire was used to go to the posterior aspect of the uncinate. The tip of the guidewire was placed just lateral to the uncinate process and the guidewire was advanced. Once the guidewire was placed across the ostium, visualization was found by light in the left maxillary sinus. The balloon was advanced over the ostium and on the inflation to 12 mm of pressure.  The balloon did open up the uncinate process as well and the bowing of this uncinate process medially showed that we were in the appropriate position. This was allowed to hold for 3 seconds and then taken down. The patient's sinus wasthen irrigated out as well with 180 mL of normal saline and then the balloon and guidewire were withdrawn. The maxillary seeker was removed from the nose. RIGHT:  With the maxillary sinuses, starting on the right side, the patient was not found to have an accessory os. The patient was not also found to have a jocelyne bullosa which was not reduced to allow access into the Osteomeatal complex. The maxillary sinus guidewire was used to go to the posterior aspect of the uncinate. The tip of the guidewire was placed just lateral to the uncinate process and the guidewire was advanced. Once the guidewire was placed across the ostium, visualization was found by light in the right maxillary sinus. The balloon was advanced over the ostium and on the inflation to 12 mm of pressure. The balloon did open up the uncinate process as well and the bowing of this uncinate process medially showed that we were in the appropriate position. This was allowed to hold for 3 seconds and then taken down. The patient's sinus was then irrigated out as well with 180 mL of normal saline and then the balloon and guidewire were withdrawn. The maxillary seeker was removed from the nose. SMRITS  The bilateral inferior turbinates were medialized with a boies elevator. Saline was then injected with a spinal needle into the inferior turbinates, bilaterally. Using an arthrocare Reflex wand, a small puncture hole was made with the instrument in the anterior portion of the left inferior turbinate. The instrument was advanced along the bone of the turbinate, elevating the periostium from the mucosa. The mucosa was then ablated until a thin mucosal layer was left over the bone. A Fanning motion was used to cover the entire turbinate. The turbinate was then lateralized with a boies elevator. Attention was turned to the right side. Using an arthrocare Reflex wand, a small puncture hole was made with the instrument in the anterior portion of the left inferior turbinate. The instrument was advanced along the bone of the turbinate, elevating the periostium from the mucosa. The mucosa was then ablated until a thin mucosal layer was left over the bone. A Fanning motion was used to cover the entire turbinate. The turbinate was then lateralized with a boies elevator. Dressing  Propel implant  LEFT   At the completion of these procedures, the patient's middle turbinate was medialized with a Propel Contour and Mini Middle turbinate implant to hold the middle turbinate open. RIGHT  At the completion of these procedures, the patient's middle turbinate was medialized with a Propel Contour and Mini Middle turbinate implant to hold the middle turbinate open. The middle turbinates were not then packed with a Sinu-Foam gel. The inferior turbinates where then packed with Naso-stent foam. The patient was turned back to Anesthesia for appropriate awakening. Dr. Suzy Ortiz and Dr. Padmini Joe performed the procedure.     Electronically signed by Tamika Hinkle DO on 7/16/18 at 9:56 AM

## 2018-07-16 NOTE — PROGRESS NOTES
10:17 NASAL PACKING: BILATERAL NARES, MINI AND CONTOUR PROPELS, NASA STENT. MOUSTACHE DRESSING.  Minoo Ferrara RN

## 2018-07-16 NOTE — ANESTHESIA PRE PROCEDURE
Department of Anesthesiology  Preprocedure Note       Name:  Darcie Palma   Age:  20 y.o.  :  1995                                          MRN:  [id number]         Date:  2018      Surgeon: Elizabeth Gloria):  Madhavi Deng DO    Procedure: Procedure(s):  SINUS ENDOSCOPY FUNCTIONAL SURGERY, SUBMUCOSAL RESECTION INFERIOR TURBINATES    Medications prior to admission:   Prior to Admission medications    Medication Sig Start Date End Date Taking? Authorizing Provider   NONFORMULARY IUD   Yes Historical Provider, MD   ibuprofen (ADVIL;MOTRIN) 800 MG tablet TK 1 T PO TID 3/19/18   Historical Provider, MD   fluticasone (FLONASE) 50 MCG/ACT nasal spray SHAKE LIQUID AND USE 1 SPRAY IN EACH NOSTRIL DAILY 18   Marques Dasilva MD   azelastine (ASTELIN) 0.1 % nasal spray 2 sprays by Nasal route 2 times daily Use in each nostril as directed 3/19/18   Marques Dasilva MD   loratadine (CLARITIN) 10 MG capsule Take 1 capsule by mouth daily 3/19/18   Marques Dasilva MD   escitalopram (LEXAPRO) 20 MG tablet TAKE 1 TABLET BY MOUTH DAILY  Patient taking differently: Take 20 mg by mouth daily Instructed to take am of procedure 18   Nubia Wall MD   Multiple Vitamins-Minerals (THERAPEUTIC MULTIVITAMIN-MINERALS) tablet Take 1 tablet by mouth LD 18    Historical Provider, MD       Current medications:    Current Facility-Administered Medications   Medication Dose Route Frequency Provider Last Rate Last Dose    sodium chloride flush 0.9 % injection 10 mL  10 mL Intravenous 2 times per day Madhavi Deng DO        sodium chloride flush 0.9 % injection 10 mL  10 mL Intravenous PRN Madhavi Deng DO        oxymetazoline (AFRIN) 0.05 % nasal spray 2 spray  2 spray Each Nare PRN Madhavi Deng DO           Allergies:     Allergies   Allergen Reactions    Bactrim [Sulfamethoxazole-Trimethoprim] Rash       Problem List:    Patient Active Problem List   Diagnosis Code   

## 2018-07-18 ENCOUNTER — TELEPHONE (OUTPATIENT)
Dept: ENT CLINIC | Age: 23
End: 2018-07-18

## 2018-07-18 NOTE — TELEPHONE ENCOUNTER
Patient called concerned that while using the netti pot the saline was coming out of her mouth. Notified patient this can occur. Patient also concerned that she is having chest pains. Patient notified she needs to go to ER for this. She said the pains are not in her chest more towards her neck and the area is sore. Patient said she would like to know if this could be from intubation.

## 2018-07-19 NOTE — TELEPHONE ENCOUNTER
Per Dr Gold Age throat irration can occur after extubation. Patient needs time to heal from this. Patient notified to call back with any further questions/concerns.

## 2018-07-21 NOTE — INTERVAL H&P NOTE
Mikey Barth was reexamined preoperatively today, 7/16/18. There is no substantial change in her physical status since the time of her pre-anesthesia testing. She is fit for the proposed surgical procedure. Mikey Barth has no further questions regarding the risks, benefits, nature and alternatives of surgery and wishes to proceed.

## 2018-07-31 ENCOUNTER — OFFICE VISIT (OUTPATIENT)
Dept: ENT CLINIC | Age: 23
End: 2018-07-31

## 2018-07-31 VITALS
HEART RATE: 66 BPM | OXYGEN SATURATION: 98 % | BODY MASS INDEX: 26.58 KG/M2 | TEMPERATURE: 98.3 F | HEIGHT: 63 IN | SYSTOLIC BLOOD PRESSURE: 114 MMHG | WEIGHT: 150 LBS | DIASTOLIC BLOOD PRESSURE: 67 MMHG

## 2018-07-31 DIAGNOSIS — Z98.890 STATUS POST FUNCTIONAL ENDOSCOPIC SINUS SURGERY (FESS): Primary | ICD-10-CM

## 2018-07-31 PROCEDURE — 99024 POSTOP FOLLOW-UP VISIT: CPT | Performed by: OTOLARYNGOLOGY

## 2018-09-04 RX ORDER — AMOXICILLIN AND CLAVULANATE POTASSIUM 875; 125 MG/1; MG/1
1 TABLET, FILM COATED ORAL 2 TIMES DAILY
Qty: 28 TABLET | Refills: 0 | Status: SHIPPED | OUTPATIENT
Start: 2018-09-04 | End: 2018-09-18

## 2018-09-04 NOTE — TELEPHONE ENCOUNTER
LVM with patient advising that the antibiotic is pending doctor signature, and may not be sent over until tomorrow sometime. Asked her to callback with any questions or concerns.     Electronically signed by Nellie Cook MA on 9/4/18 at 2:15 PM

## 2018-09-04 NOTE — TELEPHONE ENCOUNTER
Antibiotic approved my Dr. Calderón Going. Medication pended.     Electronically signed by Paola Neely MA on 9/4/18 at 12:47 PM

## 2019-02-20 ENCOUNTER — TELEPHONE (OUTPATIENT)
Dept: ENT CLINIC | Age: 24
End: 2019-02-20

## 2019-02-21 ENCOUNTER — OFFICE VISIT (OUTPATIENT)
Dept: FAMILY MEDICINE CLINIC | Age: 24
End: 2019-02-21
Payer: MEDICAID

## 2019-02-21 VITALS
SYSTOLIC BLOOD PRESSURE: 112 MMHG | WEIGHT: 150 LBS | RESPIRATION RATE: 18 BRPM | OXYGEN SATURATION: 98 % | BODY MASS INDEX: 26.58 KG/M2 | HEIGHT: 63 IN | DIASTOLIC BLOOD PRESSURE: 76 MMHG | HEART RATE: 56 BPM

## 2019-02-21 DIAGNOSIS — G43.809 OTHER MIGRAINE WITHOUT STATUS MIGRAINOSUS, NOT INTRACTABLE: Primary | ICD-10-CM

## 2019-02-21 PROCEDURE — G8419 CALC BMI OUT NRM PARAM NOF/U: HCPCS | Performed by: FAMILY MEDICINE

## 2019-02-21 PROCEDURE — 99213 OFFICE O/P EST LOW 20 MIN: CPT | Performed by: FAMILY MEDICINE

## 2019-02-21 PROCEDURE — G8427 DOCREV CUR MEDS BY ELIG CLIN: HCPCS | Performed by: FAMILY MEDICINE

## 2019-02-21 PROCEDURE — G8484 FLU IMMUNIZE NO ADMIN: HCPCS | Performed by: FAMILY MEDICINE

## 2019-02-21 PROCEDURE — 1036F TOBACCO NON-USER: CPT | Performed by: FAMILY MEDICINE

## 2019-02-21 RX ORDER — SUMATRIPTAN 50 MG/1
50 TABLET, FILM COATED ORAL
Qty: 9 TABLET | Refills: 3 | Status: SHIPPED | OUTPATIENT
Start: 2019-02-21 | End: 2019-03-02 | Stop reason: ALTCHOICE

## 2019-02-21 RX ORDER — ONDANSETRON 8 MG/1
8 TABLET, ORALLY DISINTEGRATING ORAL EVERY 8 HOURS PRN
Qty: 12 TABLET | Refills: 1 | Status: SHIPPED | OUTPATIENT
Start: 2019-02-21 | End: 2019-03-02 | Stop reason: ALTCHOICE

## 2019-02-22 ENCOUNTER — TELEPHONE (OUTPATIENT)
Dept: FAMILY MEDICINE CLINIC | Age: 24
End: 2019-02-22

## 2019-03-02 ENCOUNTER — HOSPITAL ENCOUNTER (EMERGENCY)
Age: 24
Discharge: HOME | End: 2019-03-02
Attending: EMERGENCY MEDICINE
Payer: MEDICAID

## 2019-03-02 VITALS
BODY MASS INDEX: 22.15 KG/M2 | TEMPERATURE: 98.1 F | SYSTOLIC BLOOD PRESSURE: 118 MMHG | DIASTOLIC BLOOD PRESSURE: 70 MMHG | OXYGEN SATURATION: 99 % | HEART RATE: 66 BPM | HEIGHT: 63 IN | RESPIRATION RATE: 16 BRPM | WEIGHT: 125 LBS

## 2019-03-02 DIAGNOSIS — J02.9 ACUTE PHARYNGITIS, UNSPECIFIED ETIOLOGY: Primary | ICD-10-CM

## 2019-03-02 LAB — STREP GRP A PCR: NEGATIVE

## 2019-03-02 PROCEDURE — 99282 EMERGENCY DEPT VISIT SF MDM: CPT

## 2019-03-02 PROCEDURE — 87880 STREP A ASSAY W/OPTIC: CPT

## 2019-03-02 RX ORDER — METHYLPREDNISOLONE 4 MG/1
TABLET ORAL
Qty: 1 KIT | Refills: 0 | Status: SHIPPED | OUTPATIENT
Start: 2019-03-02 | End: 2019-03-08

## 2019-03-02 RX ORDER — AZITHROMYCIN 250 MG/1
TABLET, FILM COATED ORAL
Qty: 1 PACKET | Refills: 0 | Status: SHIPPED | OUTPATIENT
Start: 2019-03-02 | End: 2019-03-12

## 2019-03-02 ASSESSMENT — PAIN DESCRIPTION - PAIN TYPE: TYPE: ACUTE PAIN

## 2019-03-02 ASSESSMENT — PAIN DESCRIPTION - ORIENTATION: ORIENTATION: RIGHT

## 2019-03-02 ASSESSMENT — PAIN DESCRIPTION - PROGRESSION: CLINICAL_PROGRESSION: NOT CHANGED

## 2019-03-02 ASSESSMENT — PAIN DESCRIPTION - LOCATION: LOCATION: EAR;THROAT

## 2019-03-02 ASSESSMENT — PAIN DESCRIPTION - DESCRIPTORS: DESCRIPTORS: ACHING;CONSTANT;SORE

## 2019-03-02 ASSESSMENT — PAIN SCALES - GENERAL: PAINLEVEL_OUTOF10: 8

## 2019-03-02 ASSESSMENT — PAIN - FUNCTIONAL ASSESSMENT: PAIN_FUNCTIONAL_ASSESSMENT: ACTIVITIES ARE NOT PREVENTED

## 2019-03-02 ASSESSMENT — PAIN DESCRIPTION - ONSET: ONSET: ON-GOING

## 2019-04-08 ENCOUNTER — HOSPITAL ENCOUNTER (EMERGENCY)
Age: 24
Discharge: HOME | End: 2019-04-08
Attending: EMERGENCY MEDICINE
Payer: MEDICAID

## 2019-04-08 VITALS
TEMPERATURE: 98.4 F | HEART RATE: 55 BPM | HEIGHT: 63 IN | DIASTOLIC BLOOD PRESSURE: 84 MMHG | BODY MASS INDEX: 24.8 KG/M2 | SYSTOLIC BLOOD PRESSURE: 126 MMHG | WEIGHT: 140 LBS | RESPIRATION RATE: 16 BRPM | OXYGEN SATURATION: 98 %

## 2019-04-08 DIAGNOSIS — J02.9 VIRAL PHARYNGITIS: Primary | ICD-10-CM

## 2019-04-08 LAB — STREP GRP A PCR: NEGATIVE

## 2019-04-08 PROCEDURE — 99282 EMERGENCY DEPT VISIT SF MDM: CPT

## 2019-04-08 PROCEDURE — 87880 STREP A ASSAY W/OPTIC: CPT

## 2019-04-08 ASSESSMENT — PAIN SCALES - GENERAL: PAINLEVEL_OUTOF10: 5

## 2019-04-08 ASSESSMENT — PAIN DESCRIPTION - DESCRIPTORS: DESCRIPTORS: DISCOMFORT

## 2019-04-08 ASSESSMENT — PAIN DESCRIPTION - PAIN TYPE: TYPE: ACUTE PAIN

## 2019-04-08 ASSESSMENT — PAIN DESCRIPTION - LOCATION: LOCATION: THROAT

## 2019-04-08 NOTE — ED PROVIDER NOTES
HPI:  19,   Time: 7:41 AM         Naomy Alva is a 20 y.o. female presenting to the ED for a sore throat, beginning 1d ago. The complaint has been constant, severe in severity, and worsened by swallowing. She denies earache cough fever or chills but she feels fatigued    ROS:   Pertinent positives and negatives are stated within HPI, all other systems reviewed and are negative.  --------------------------------------------- PAST HISTORY ---------------------------------------------  Past Medical History:  has a past medical history of Anxiety, Chronic sinusitis, and Seasonal allergies. Past Surgical History:  has a past surgical history that includes  section and pr nasal/sinus endoscopy,w/dilat frontal sinus ostium (N/A, 2018). Social History:  reports that she has never smoked. She has never used smokeless tobacco. She reports that she does not drink alcohol or use drugs. Family History: family history is not on file. The patients home medications have been reviewed. Allergies: Bactrim [sulfamethoxazole-trimethoprim]    -------------------------------------------------- RESULTS -------------------------------------------------  All laboratory and radiology results have been personally reviewed by myself   LABS:  Results for orders placed or performed during the hospital encounter of 19   Strep Screen Group A Throat   Result Value Ref Range    Strep Grp A PCR Negative Negative       RADIOLOGY:  Interpreted by Radiologist.  No orders to display       ------------------------- NURSING NOTES AND VITALS REVIEWED ---------------------------   The nursing notes within the ED encounter and vital signs as below have been reviewed.    /84   Pulse 55   Temp 98.4 °F (36.9 °C) (Oral)   Resp 16   Ht 5' 3\" (1.6 m)   Wt 140 lb (63.5 kg)   SpO2 98%   BMI 24.80 kg/m²   Oxygen Saturation Interpretation: Normal      ---------------------------------------------------PHYSICAL

## 2020-10-06 ENCOUNTER — TELEPHONE (OUTPATIENT)
Dept: ADMINISTRATIVE | Age: 25
End: 2020-10-06

## 2021-04-19 ENCOUNTER — CLINICAL DOCUMENTATION (OUTPATIENT)
Dept: FAMILY MEDICINE CLINIC | Age: 26
End: 2021-04-19

## 2021-04-19 ENCOUNTER — OFFICE VISIT (OUTPATIENT)
Dept: FAMILY MEDICINE CLINIC | Age: 26
End: 2021-04-19
Payer: MEDICAID

## 2021-04-19 VITALS
HEIGHT: 63 IN | SYSTOLIC BLOOD PRESSURE: 117 MMHG | OXYGEN SATURATION: 99 % | BODY MASS INDEX: 24.98 KG/M2 | RESPIRATION RATE: 16 BRPM | WEIGHT: 141 LBS | HEART RATE: 63 BPM | TEMPERATURE: 98.9 F | DIASTOLIC BLOOD PRESSURE: 72 MMHG

## 2021-04-19 DIAGNOSIS — Z00.00 ANNUAL PHYSICAL EXAM: Primary | ICD-10-CM

## 2021-04-19 DIAGNOSIS — J32.9 CHRONIC SINUSITIS, UNSPECIFIED LOCATION: ICD-10-CM

## 2021-04-19 DIAGNOSIS — F41.9 ANXIETY: ICD-10-CM

## 2021-04-19 PROCEDURE — 99395 PREV VISIT EST AGE 18-39: CPT | Performed by: FAMILY MEDICINE

## 2021-04-19 RX ORDER — SERTRALINE HYDROCHLORIDE 25 MG/1
25 TABLET, FILM COATED ORAL DAILY
Qty: 30 TABLET | Refills: 3 | Status: SHIPPED
Start: 2021-04-19 | End: 2021-05-17 | Stop reason: SDUPTHER

## 2021-04-19 RX ORDER — LORATADINE 10 MG/1
10 TABLET ORAL DAILY
Qty: 30 TABLET | Refills: 5 | Status: SHIPPED
Start: 2021-04-19 | End: 2021-06-14 | Stop reason: SDUPTHER

## 2021-04-19 RX ORDER — FLUTICASONE PROPIONATE 50 MCG
2 SPRAY, SUSPENSION (ML) NASAL DAILY
Qty: 1 BOTTLE | Refills: 5 | Status: SHIPPED
Start: 2021-04-19 | End: 2021-10-05 | Stop reason: ALTCHOICE

## 2021-04-19 SDOH — ECONOMIC STABILITY: TRANSPORTATION INSECURITY
IN THE PAST 12 MONTHS, HAS THE LACK OF TRANSPORTATION KEPT YOU FROM MEDICAL APPOINTMENTS OR FROM GETTING MEDICATIONS?: NO

## 2021-04-19 SDOH — ECONOMIC STABILITY: FOOD INSECURITY: WITHIN THE PAST 12 MONTHS, THE FOOD YOU BOUGHT JUST DIDN'T LAST AND YOU DIDN'T HAVE MONEY TO GET MORE.: NEVER TRUE

## 2021-04-19 SDOH — ECONOMIC STABILITY: FOOD INSECURITY: WITHIN THE PAST 12 MONTHS, YOU WORRIED THAT YOUR FOOD WOULD RUN OUT BEFORE YOU GOT MONEY TO BUY MORE.: NEVER TRUE

## 2021-04-19 NOTE — PROGRESS NOTES
Warm handoff requested by Ale Jay DO and was completed. Patient scheduled for future face to face visit. Patient in new to PCP. Reports struggles with anxiety. Previously reports tx with lexapro but did not notice benefit from this. Denies other [facility] hx/counseling in the past. Denies sx of depression. C-SSRS completed and negative. Patient oriented to [facility] services and in agreement. Scheduled for counseling on Monday 4/26 at 9:00AM. Patient provided SW contact information.

## 2021-04-19 NOTE — PROGRESS NOTES
4/19/2021    Chief Complaint   Patient presents with    Establish Care       Screening Questions:    Exercise 30 minutes daily 5 times weekly:  Yes   Pap smear UTD:  Yes    Specialists:  No    Fecal occult blood yearly after age 50/Colonoscopy:  No   Eye exam every 2-4 years, yearly if diabetic:  No    Dental exam:  yes    Hearing Evaluation/Hearing Aid:  No    BMI elevated: Body mass index is 24.98 kg/m². [place] [employer] Counseled on weight loss   Tobacco use: No  . Cessation discussed    Anxiety and/or Depression:  Patient is here with complaints of anxiety and/or depression. This is a/an chronic problem. Exacerbating factors include home, family. Alleviating factors include medications. Anxiety/depression symptoms include: positive for - anxiety. Patient does not have suicidal or homicidal ideation. Patient is not having issues falling or staying asleep. Patient is not having associated panic attacks. Patient has not seen a Counselor before. Patient does not use alcohol and/or drugs. Family history reviewed. Is currently taking Lexapro. Labs:  No results found for: EAG  No results found for: LDLCALC, LDLCHOLESTEROL, LDLDIRECT   CBC:   Lab Results   Component Value Date    WBC 8.4 03/06/2018    RBC 4.79 03/06/2018    HGB 13.3 03/06/2018    HCT 40.2 03/06/2018    MCV 83.9 03/06/2018    MCH 27.8 03/06/2018    MCHC 33.1 03/06/2018    RDW 13.2 03/06/2018     03/06/2018    MPV 8.6 03/06/2018         Patient's past medical, surgical, social and/or family history reviewed, updated in chart, and are non-contributory (unless otherwise stated). Medications and allergies also reviewed and updated in chart.     ROS  Review of Systems - General ROS: negative for - chills, fatigue, fever, night sweats, sleep disturbance, weight gain or weight loss  Psychological ROS: negative for - anxiety, behavioral disorder, depression, hallucinations, irritability, memory difficulties, mood swings, sleep disturbances or suicidal ideation  ENT ROS: negative for - epistaxis, headaches, hearing change, nasal congestion, nasal discharge, nasal polyps, sinus pain, tinnitus, vertigo or visual changes  Hematological and Lymphatic ROS: negative for - bleeding problems, blood clots, fatigue or swollen lymph nodes  Respiratory ROS: negative for - cough, orthopnea, shortness of breath, sputum changes, tachypnea or wheezing  Cardiovascular ROS: negative for - chest pain, dyspnea on exertion, irregular heartbeat, loss of consciousness, palpitations, paroxysmal nocturnal dyspnea or rapid heart rate  Gastrointestinal ROS: negative for - abdominal pain, blood in stools, change in bowel habits, constipation, diarrhea, gas/bloating, heartburn or nausea/vomiting  Musculoskeletal ROS: negative for - joint pain, joint stiffness, joint swelling or muscle, back pain, bowel or bladder incontinence  Neurological ROS: negative for - behavioral changes, confusion, dizziness, headaches, memory loss, numbness/tingling, seizures or speech problems, weakness  Dermatological ROS: negative for - dry skin, mole changes, nail changes, pruritus, rash or skin lesion changes    Physical Exam  /72   Pulse 63   Temp 98.9 °F (37.2 °C) (Temporal)   Resp 16   Ht 5' 3\" (1.6 m)   Wt 141 lb (64 kg)   SpO2 99%   BMI 24.98 kg/m²   Wt Readings from Last 3 Encounters:   04/19/21 141 lb (64 kg)   04/08/19 140 lb (63.5 kg)   03/02/19 125 lb (56.7 kg)     Temp Readings from Last 3 Encounters:   04/19/21 98.9 °F (37.2 °C) (Temporal)   04/08/19 98.4 °F (36.9 °C) (Oral)   03/02/19 98.1 °F (36.7 °C) (Oral)     BP Readings from Last 3 Encounters:   04/19/21 117/72   04/08/19 126/84   03/02/19 118/70     Pulse Readings from Last 3 Encounters:   04/19/21 63   04/08/19 55   03/02/19 66       General appearance: alert, well appearing, and in no distress, oriented to person, place, and time and normal appearing weight.     CVS exam: normal rate, regular rhythm, normal S1, S2, no patient to call with any new medication issues, and read all Rx info from pharmacy to assure aware of all possible risks and side effects of medication before taking. Reviewed age and gender appropriate health screening exams and vaccinations. Advised patient regarding importance of keeping up with recommended health maintenance and to schedule as soon as possible if overdue, as this is important in assessing for undiagnosed pathology, especially cancer, as well as protecting against potentially harmful/life threatening disease. Patient and/or guardian verbalizes understanding and agrees with above counseling, assessment and plan. All questions answered.

## 2021-04-26 ENCOUNTER — TELEPHONE (OUTPATIENT)
Dept: FAMILY MEDICINE CLINIC | Age: 26
End: 2021-04-26

## 2021-04-26 NOTE — TELEPHONE ENCOUNTER
SW received e-mail from patient indicating a need to cancel counseling appt for today. She requested dates for reschedule. Will await response.

## 2021-05-17 ENCOUNTER — OFFICE VISIT (OUTPATIENT)
Dept: FAMILY MEDICINE CLINIC | Age: 26
End: 2021-05-17
Payer: MEDICAID

## 2021-05-17 ENCOUNTER — SOCIAL WORK (OUTPATIENT)
Dept: FAMILY MEDICINE CLINIC | Age: 26
End: 2021-05-17

## 2021-05-17 VITALS
BODY MASS INDEX: 24.8 KG/M2 | RESPIRATION RATE: 16 BRPM | HEART RATE: 64 BPM | SYSTOLIC BLOOD PRESSURE: 100 MMHG | OXYGEN SATURATION: 98 % | HEIGHT: 63 IN | WEIGHT: 140 LBS | TEMPERATURE: 98.6 F | DIASTOLIC BLOOD PRESSURE: 74 MMHG

## 2021-05-17 DIAGNOSIS — G43.709 CHRONIC MIGRAINE WITHOUT AURA WITHOUT STATUS MIGRAINOSUS, NOT INTRACTABLE: Primary | ICD-10-CM

## 2021-05-17 DIAGNOSIS — F41.9 ANXIETY: ICD-10-CM

## 2021-05-17 PROCEDURE — G8427 DOCREV CUR MEDS BY ELIG CLIN: HCPCS | Performed by: FAMILY MEDICINE

## 2021-05-17 PROCEDURE — G8420 CALC BMI NORM PARAMETERS: HCPCS | Performed by: FAMILY MEDICINE

## 2021-05-17 PROCEDURE — 99214 OFFICE O/P EST MOD 30 MIN: CPT | Performed by: FAMILY MEDICINE

## 2021-05-17 PROCEDURE — 1036F TOBACCO NON-USER: CPT | Performed by: FAMILY MEDICINE

## 2021-05-17 RX ORDER — SUMATRIPTAN 25 MG/1
25 TABLET, FILM COATED ORAL
Qty: 9 TABLET | Refills: 3 | Status: SHIPPED
Start: 2021-05-17 | End: 2021-06-14 | Stop reason: SDUPTHER

## 2021-05-17 NOTE — PATIENT INSTRUCTIONS
Magnesium Oxide (400-1000 mg daily):  has a relaxant effect on smooth muscles and blood vessels. We often give intravenous magnesium to patients who come into the emergency department for migraine because it helps to break the migraine. Three trials found 40-90% average headache reduction when used as a preventative. Magnesium also demonstrated the benefit in menstrually related migraine. Magnesium is part of the messenger system in the serotonin cascade and it is a good muscle relaxant. It is also useful for constipation which can be a side effect of other medications used to treat migraine. Good sources include nuts, whole grains, and tomatoes. Coenzyme Q10 (200 mg daily or 150 mg twice daily): twice a day have been shown to be effective. (: This is present in almost all cells in the body and is critical component for the conversion of energy. Recent studies have shown that a nutritional supplement of CoQ10 can reduce the frequency of migraine attacks by improving the energy production of cells as with riboflavin. Riboflavin (Vitamin B2): 200 mg twice a day (or 400 mg daily). This vitamin assists nerve cells in the production of ATP, a principal energy storing molecule. It is necessary for many chemical reactions in the body. There have been at least 3 clinical trials of riboflavin using 400 mg per day all of which suggested that migraine frequency can be decreased. All 3 trials showed significant improvement in over half of migraine sufferers. The supplement is found in bread, cereal, milk, meat, and poultry. Most Americans get more riboflavin than the recommended daily allowance, however riboflavin deficiency is not necessary for the supplements to help prevent headache. Melatonin: Increasing evidence shows correlation between melatonin secretion and headache conditions. Melatonin supplementation has shown decreased headache intensity and duration. It is widely used as a sleep aid.  Sleep is nature's way of dealing with migraine. A dose of 3 mg is recommended to start for headaches including cluster headache. Higher doses up to 15 mg has been reviewed for use in Cluster headache and have been used. The rationale behind using melatonin for cluster is that many theories regarding the cause of Cluster headache center around the disruption of the normal circadian rhythm in the brain. This helps restore the normal circadian rhythm. It should be taken at least 2 hours before bedtime. Airam: Airam has a small amount of anti-histamine and anti-inflammatory action which may help headache. It is primarily used for nausea and may aid in the absorption of other medications.

## 2021-05-17 NOTE — PROGRESS NOTES
Patient present for F/U with PCP. She reports some improvement in anxiety. Reports very busy so did not call back to get rescheduled.  Scheduled today for counseling Monday 5/24 at 12:00PM

## 2021-05-17 NOTE — PROGRESS NOTES
5/17/2021    Chief Complaint   Patient presents with    Anxiety     medication check  working for her        HPI    Josie Dunn is a 27 y.o. patient that presents today for:    Anxiety and/or Depression:  Patient is here with complaints of anxiety and/or depression. This is a/an chronic problem. Exacerbating factors include home, family. Alleviating factors include medications. Anxiety/depression symptoms include: positive for - anxiety and depression. Patient does not have suicidal or homicidal ideation. Patient is not having issues falling or staying asleep. Patient is not having associated panic attacks. Patient does not use alcohol and/or drugs. Family history reviewed. Is currently taking Zoloft. WOuld like to increase meds. Migraine:  Patient complains of migraine headache for many year(s). She has a well established history of recurrent migraines. This is  similar to headaches in the past. These typically last for day(s). .    Frequency of episodes is 1 time monthly for 1 week. Triggers: weather changes and bright light  Description of pain: throbbing pain. Associated symptoms: light sensitivity and nausea. Patient takes headche meds for her headaches with relief of symptoms. Prophylaxis: no    Imaging to date: no     There are no associated abnormal neurological symptoms such as TIA's, loss of balance, loss of vision or speech, numbness or weakness on review. Past neurological history: negative for stroke, MS, epilepsy, or brain tumor. Patient's past medical, surgical, social and/or family history reviewed, updated in chart, and are non-contributory (unless otherwise stated). Medications and allergies also reviewed and updated in chart.      ROS negative unless otherwise specified    Physical Exam  Temp Readings from Last 3 Encounters:   05/17/21 98.6 °F (37 °C)   04/19/21 98.9 °F (37.2 °C) (Temporal)   04/08/19 98.4 °F (36.9 °C) (Oral)     Wt Readings from Last 3 Encounters:   05/17/21 140 lb (63.5 kg)   04/19/21 141 lb (64 kg)   04/08/19 140 lb (63.5 kg)     BP Readings from Last 3 Encounters:   05/17/21 100/74   04/19/21 117/72   04/08/19 126/84     Pulse Readings from Last 3 Encounters:   05/17/21 64   04/19/21 63   04/08/19 55       General appearance: alert, well appearing, and in no distress, oriented to person, place, and time and normal appearing weight. CVS exam: normal rate, regular rhythm, normal S1, S2, no murmurs, rubs, clicks or gallops. Radial pulses 2+ bilateral.  PT/DP pulse 2+ bilat. No C/C/E    Chest: clear to auscultation, no wheezes, rales or rhonchi, symmetric air entry. Abdomen: Soft, non-tender, non-distended, positive BS in all 4 quadrants    Extremities:Dorsalis pedis pulses palpated bilaterally, no clubbing, cyanosis, edema or erythema,     SKIN: no lesions, jaundice, petechiae, pallor, cyanosis, ecchymosis    NEURO: gross motor exam normal by observation, gait normal    Mental status - alert, oriented to person, place, and time, normal mood, behavior, speech, dress, motor activity, and thought processes      Assessment/Plan  Avani was seen today for anxiety. Diagnoses and all orders for this visit:    Chronic migraine without aura without status migrainosus, not intractable  -     START:]SUMAtriptan (IMITREX) 25 MG tablet; Take 1 tablet by mouth once as needed for Migraine Take 1 tablet at the onset of headache. May repeat x1 dose in 2 hours if HA persists. Max of 2 pills per day PRN    Anxiety  -     INCREASE: sertraline (ZOLOFT) 50 MG tablet; Take 1 tablet by mouth daily          Return in about 4 weeks (around 6/14/2021). Robyn Wu,     Call or go to ED immediately if symptoms worsen or persist.    Educational materials and/or home exercises printed for patient's review and were included in patient instructions on his/her After Visit Summary and given to patient at the end of visit.        Counseled regarding above diagnosis, including possible risks and complications,  especially if left uncontrolled. Counseled regarding the possible side effects, risks, benefits and alternatives to treatment; patient and/or guardian verbalizes understanding, agrees, feels comfortable with and wishes to proceed with above treatment plan. Advised patient to call with any new medication issues, and read all Rx info from pharmacy to assure aware of all possible risks and side effects of medication before taking. Reviewed age and gender appropriate health screening exams and vaccinations. Advised patient regarding importance of keeping up with recommended health maintenance and to schedule as soon as possible if overdue, as this is important in assessing for undiagnosed pathology, especially cancer, as well as protecting against potentially harmful/life threatening disease. Patient and/or guardian verbalizes understanding and agrees with above counseling, assessment and plan. All questions answered.

## 2021-05-24 ENCOUNTER — OFFICE VISIT (OUTPATIENT)
Dept: FAMILY MEDICINE CLINIC | Age: 26
End: 2021-05-24
Payer: MEDICAID

## 2021-05-24 DIAGNOSIS — F41.9 ANXIETY: Primary | ICD-10-CM

## 2021-05-24 PROCEDURE — 90791 PSYCH DIAGNOSTIC EVALUATION: CPT | Performed by: SOCIAL WORKER

## 2021-05-24 PROCEDURE — 1036F TOBACCO NON-USER: CPT | Performed by: SOCIAL WORKER

## 2021-05-24 ASSESSMENT — ANXIETY QUESTIONNAIRES
GAD7 TOTAL SCORE: 9
4. TROUBLE RELAXING: 1-SEVERAL DAYS
2. NOT BEING ABLE TO STOP OR CONTROL WORRYING: 2-OVER HALF THE DAYS
7. FEELING AFRAID AS IF SOMETHING AWFUL MIGHT HAPPEN: 0-NOT AT ALL
6. BECOMING EASILY ANNOYED OR IRRITABLE: 2-OVER HALF THE DAYS
1. FEELING NERVOUS, ANXIOUS, OR ON EDGE: 2-OVER HALF THE DAYS

## 2021-05-24 ASSESSMENT — PATIENT HEALTH QUESTIONNAIRE - PHQ9
2. FEELING DOWN, DEPRESSED OR HOPELESS: 0
SUM OF ALL RESPONSES TO PHQ9 QUESTIONS 1 & 2: 0
1. LITTLE INTEREST OR PLEASURE IN DOING THINGS: 0

## 2021-05-24 NOTE — PROGRESS NOTES
ADULT BEHAVIORAL HEALTH ASSESSMENT  Piero Ruiz     Visit Date: 5/24/2021   Time of appointment:  12:00PM   Time spent with Patient: 35 minutes. This is patient's first appointment. Reason for Consult:  Anxiety     Referring Provider/PCP:    Robyn Wu DO      Pt provided informed consent for the behavioral health program. Discussed with patient model of service to include the limits of confidentiality (i.e. abuse reporting, suicide intervention, etc.) and short-term intervention focused approach. PRESENTING PROBLEM AND HISTORY  Marek Juarez is a 27 y.o. female who presents for new evaluation and treatment of anxiety. She has the following symptoms: increased appetite, anger/irritability, feeling nervous, anxious, or on edge, racing worry thoughts, excessive anxiety and worry about specific stressors, inability to stop or control worry and difficulty with attention/concentration. Reports increased frustration when dealing with children and not listening. Reports overeating when anxious/stressed. Onset of symptoms was approximately several years ago. Symptoms have been flucutating since that time. She denies current suicidal and homicidal ideation. Family history significant for anxiety. Risk factors: positive family history in  father. Previous treatment includes Lexapro and Zoloft. She complains of the following medication side effects: none. MENTAL STATUS EXAM  Mood was euthymic with congruent affect. Suicidal ideation was denied. Homicidal ideation was denied. Hygiene was good . Dress was neat. Behavior was Within Normal Limits with No observation or self-report of difficulties ambulating. Attitude was Cooperative. Eye-contact was good. Speech: rate - WNL, rhythm -  WNL, volume - WNL  Verbalizations were goal directed and coherent.   Thought processes were intact and goal-oriented without evidence of delusions, hallucinations, obsessions, or dominga; without significant cognitive distortions. Associations were characterized by intact cognitive processes. Pt was oriented to person, place, time, and general circumstances;  recent:  good. Insight and judgment were estimated to be good, AEB, a good  understanding of cyclical maladaptive patterns, and the ability to use insight to inform behavior change. CURRENT MEDICATIONS    Current Outpatient Medications:     sertraline (ZOLOFT) 50 MG tablet, Take 1 tablet by mouth daily, Disp: 90 tablet, Rfl: 1    SUMAtriptan (IMITREX) 25 MG tablet, Take 1 tablet by mouth once as needed for Migraine Take 1 tablet at the onset of headache. May repeat x1 dose in 2 hours if HA persists. Max of 2 pills per day PRN, Disp: 9 tablet, Rfl: 3    loratadine (CLARITIN) 10 MG tablet, Take 1 tablet by mouth daily, Disp: 30 tablet, Rfl: 5    fluticasone (FLONASE) 50 MCG/ACT nasal spray, 2 sprays by Nasal route daily, Disp: 1 Bottle, Rfl: 5     FAMILY MEDICAL/MH HISTORY   Her family history includes Cancer in her maternal grandmother and paternal grandmother; Diabetes in her paternal grandfather. PATIENT MENTAL HEALTH HISTORY  Denies any previous mental health hx/tx. PSYCHOSOCIAL HISTORY  Current living situation: Lives with Katty Marcus,  and two daughters ages 8 and 3. Work/Education: Works as a  out of [employer]. Support system: Identifies mother and fiance as positive support system. Oriental orthodox/Spirituality: denies       DRUG AND ALCOHOL CURRENT USE/HISTORY  TOBACCO:  She reports that she has never smoked. She has never used smokeless tobacco.  ALCOHOL:  She reports no history of alcohol use. About 2x/year  OTHER SUBSTANCES: She reports no history of drug use. ASSESSMENT  Avani Robles presented to the appointment today for evaluation and treatment of symptoms of anxiety. She is currently deemed no risk to herself or others and meets criteria for Anxiety.  Avani's symptoms are well controlled at this time.  She will  benefit from brief and solution-focused consultation to address cognitive and behavioral interventions for anxiety symptoms. Avani was in agreement with recommendations. PHQ Scores 5/24/2021 3/19/2018   PHQ2 Score 0 0   PHQ9 Score 0 0     Interpretation of Total Score Depression Severity: 1-4 = Minimal depression, 5-9 = Mild depression, 10-14 = Moderate depression, 15-19 = Moderately severe depression, 20-27 = Severe depression    SAGRARIO 7 SCORE 5/24/2021   SAGRARIO-7 Total Score 9     Interpretation of SAGRARIO-7 score: 5-9 = mild anxiety, 10-14 = moderate anxiety, 15+ = severe anxiety. Recommend referral to behavioral health for scores 10 or greater. DIAGNOSIS  Avani was seen today for anxiety. Diagnoses and all orders for this visit:    Anxiety      INTERVENTION  Discussed prevalence of  anxiety for general population, Discussed various factors related to the development and maintenance of  anxiety (including biological, cognitive, behavioral, and environmental factors), Discussed potential treatments for  anxiety, Discussed self-care (sleep, nutrition, rewarding activities, social support, exercise), Discussed potential barriers to change and Provided Psychoeducation re: helpful parenting interventions, managing stress to prevent burnout. Encouraged re-initiating involvement in mindfulness/grounding, recommended 1-2-3 Magic book for parenting. Overeaters Anonymous online or in-person as an option as well. Replacement behaviors processed. PLAN  F/u in one month       INTERACTIVE COMPLEXITY  Is interactive complexity present?   No  Reason:  N/A  Additional Supporting Information:  N/A       Electronically signed by JE Watters on 5/24/21

## 2021-06-14 ENCOUNTER — OFFICE VISIT (OUTPATIENT)
Dept: FAMILY MEDICINE CLINIC | Age: 26
End: 2021-06-14
Payer: MEDICAID

## 2021-06-14 VITALS
OXYGEN SATURATION: 98 % | BODY MASS INDEX: 24.43 KG/M2 | DIASTOLIC BLOOD PRESSURE: 77 MMHG | SYSTOLIC BLOOD PRESSURE: 119 MMHG | WEIGHT: 137.9 LBS | HEIGHT: 63 IN | HEART RATE: 53 BPM | TEMPERATURE: 98.2 F

## 2021-06-14 DIAGNOSIS — F41.9 ANXIETY: Primary | ICD-10-CM

## 2021-06-14 DIAGNOSIS — G43.709 CHRONIC MIGRAINE WITHOUT AURA WITHOUT STATUS MIGRAINOSUS, NOT INTRACTABLE: ICD-10-CM

## 2021-06-14 DIAGNOSIS — J32.9 CHRONIC SINUSITIS, UNSPECIFIED LOCATION: ICD-10-CM

## 2021-06-14 DIAGNOSIS — F41.9 ANXIETY: ICD-10-CM

## 2021-06-14 PROCEDURE — 90832 PSYTX W PT 30 MINUTES: CPT | Performed by: SOCIAL WORKER

## 2021-06-14 PROCEDURE — 1036F TOBACCO NON-USER: CPT | Performed by: SOCIAL WORKER

## 2021-06-14 PROCEDURE — 99213 OFFICE O/P EST LOW 20 MIN: CPT | Performed by: FAMILY MEDICINE

## 2021-06-14 PROCEDURE — G8427 DOCREV CUR MEDS BY ELIG CLIN: HCPCS | Performed by: FAMILY MEDICINE

## 2021-06-14 PROCEDURE — G8420 CALC BMI NORM PARAMETERS: HCPCS | Performed by: FAMILY MEDICINE

## 2021-06-14 PROCEDURE — 1036F TOBACCO NON-USER: CPT | Performed by: FAMILY MEDICINE

## 2021-06-14 RX ORDER — SERTRALINE HYDROCHLORIDE 100 MG/1
100 TABLET, FILM COATED ORAL DAILY
Qty: 90 TABLET | Refills: 1 | Status: SHIPPED
Start: 2021-06-14 | End: 2021-12-10

## 2021-06-14 RX ORDER — LORATADINE 10 MG/1
10 TABLET ORAL DAILY
Qty: 90 TABLET | Refills: 1 | Status: SHIPPED
Start: 2021-06-14 | End: 2021-10-05 | Stop reason: ALTCHOICE

## 2021-06-14 RX ORDER — SUMATRIPTAN 25 MG/1
25 TABLET, FILM COATED ORAL
Qty: 27 TABLET | Refills: 1 | Status: SHIPPED
Start: 2021-06-14 | End: 2021-10-05

## 2021-06-14 ASSESSMENT — ANXIETY QUESTIONNAIRES
2. NOT BEING ABLE TO STOP OR CONTROL WORRYING: 2-OVER HALF THE DAYS
5. BEING SO RESTLESS THAT IT IS HARD TO SIT STILL: 0-NOT AT ALL
1. FEELING NERVOUS, ANXIOUS, OR ON EDGE: 1-SEVERAL DAYS
GAD7 TOTAL SCORE: 7
7. FEELING AFRAID AS IF SOMETHING AWFUL MIGHT HAPPEN: 0-NOT AT ALL
6. BECOMING EASILY ANNOYED OR IRRITABLE: 1-SEVERAL DAYS
4. TROUBLE RELAXING: 1-SEVERAL DAYS
3. WORRYING TOO MUCH ABOUT DIFFERENT THINGS: 2-OVER HALF THE DAYS

## 2021-06-14 NOTE — PROGRESS NOTES
6/30/2021    Chief Complaint   Patient presents with    Medication Check     Imitrex for headaches, says it is working for her       HPI    Clara Mcdowell is a 27 y.o. patient that presents today for:    Anxiety and/or Depression:  Patient is here with complaints of anxiety and/or depression. This is a/an chronic problem. Exacerbating factors include home, family. Alleviating factors include medications. Anxiety/depression symptoms include: positive for - anxiety and depression. Patient does not have suicidal or homicidal ideation. Patient is not having issues falling or staying asleep. Patient is not having associated panic attacks. Patient does not use alcohol and/or drugs. Family history reviewed. Is currently taking Zoloft. did increase to 50 mg last month. Would like to increase further but is feeling imporeved mood.      Migraine:  Patient complains of migraine headache for many year(s). She has a well established history of recurrent migraines. This is  similar to headaches in the past. These typically last for day(s). .     Frequency of episodes is 1 time monthly for 1 week. Triggers: weather changes and bright light  Description of pain: throbbing pain. Associated symptoms: light sensitivity and nausea. Patient takes headche meds for her headaches with relief of symptoms. Prophylaxis: no     Imaging to date: no      There are no associated abnormal neurological symptoms such as TIA's, loss of balance, loss of vision or speech, numbness or weakness on review. Past neurological history: negative for stroke, MS, epilepsy, or brain tumor. Feels much with addition of Imitrex. Patient's past medical, surgical, social and/or family history reviewed, updated in chart, and are non-contributory (unless otherwise stated). Medications and allergies also reviewed and updated in chart.      ROS negative unless otherwise specified    Physical Exam  Temp Readings from Last 3 Encounters: 06/14/21 98.2 °F (36.8 °C)   05/17/21 98.6 °F (37 °C)   04/19/21 98.9 °F (37.2 °C) (Temporal)     Wt Readings from Last 3 Encounters:   06/14/21 137 lb 14.4 oz (62.6 kg)   05/17/21 140 lb (63.5 kg)   04/19/21 141 lb (64 kg)     BP Readings from Last 3 Encounters:   06/14/21 119/77   05/17/21 100/74   04/19/21 117/72     Pulse Readings from Last 3 Encounters:   06/14/21 53   05/17/21 64   04/19/21 63       General appearance: alert, well appearing, and in no distress, oriented to person, place, and time and normal appearing weight. CVS exam: normal rate, regular rhythm, normal S1, S2, no murmurs, rubs, clicks or gallops. Radial pulses 2+ bilateral.  PT/DP pulse 2+ bilat. No C/C/E    Chest: clear to auscultation, no wheezes, rales or rhonchi, symmetric air entry. Abdomen: Soft, non-tender, non-distended, positive BS in all 4 quadrants    Extremities:Dorsalis pedis pulses palpated bilaterally, no clubbing, cyanosis, edema or erythema,     SKIN: no lesions, jaundice, petechiae, pallor, cyanosis, ecchymosis    NEURO: gross motor exam normal by observation, gait normal    Mental status - alert, oriented to person, place, and time, normal mood, behavior, speech, dress, motor activity, and thought processes      Assessment/Plan  Avani was seen today for medication check. Diagnoses and all orders for this visit:    Chronic sinusitis, unspecified location  -     loratadine (CLARITIN) 10 MG tablet; Take 1 tablet by mouth daily    Anxiety  -     sertraline (ZOLOFT) 100 MG tablet; Take 1 tablet by mouth daily    Chronic migraine without aura without status migrainosus, not intractable  -     SUMAtriptan (IMITREX) 25 MG tablet; Take 1 tablet by mouth once as needed for Migraine Take 1 tablet at the onset of headache. May repeat x1 dose in 2 hours if HA persists. Max of 2 pills per day PRN          Return in about 6 months (around 12/14/2021), or if symptoms worsen or fail to improve.       Robyn Wu, DO    Call or go to ED immediately if symptoms worsen or persist.    Educational materials and/or home exercises printed for patient's review and were included in patient instructions on his/her After Visit Summary and given to patient at the end of visit. Counseled regarding above diagnosis, including possible risks and complications,  especially if left uncontrolled. Counseled regarding the possible side effects, risks, benefits and alternatives to treatment; patient and/or guardian verbalizes understanding, agrees, feels comfortable with and wishes to proceed with above treatment plan. Advised patient to call with any new medication issues, and read all Rx info from pharmacy to assure aware of all possible risks and side effects of medication before taking. Reviewed age and gender appropriate health screening exams and vaccinations. Advised patient regarding importance of keeping up with recommended health maintenance and to schedule as soon as possible if overdue, as this is important in assessing for undiagnosed pathology, especially cancer, as well as protecting against potentially harmful/life threatening disease. Patient and/or guardian verbalizes understanding and agrees with above counseling, assessment and plan. All questions answered.

## 2021-06-14 NOTE — PROGRESS NOTES
ADULT BEHAVIORAL HEALTH FOLLOW UP  Mikayla Luh       Visit Date: 6/14/2021   Time of appointment:  10:35  Time spent with Patient:20 minutes. This is patient's second appointment. Reason for Consult:  Anxiety     Referring Provider/PCP:    Robyn Wu DO      Pt provided informed consent for the behavioral health program. Discussed with patient model of service to include the limits of confidentiality (i.e. abuse reporting, suicide intervention, etc.) and short-term intervention focused approach. Pt indicated understanding. Billy Marr is a 27 y.o. female who presents for follow up of  Anxiety. Patient reports improvement in sx since previous session. She reports use of book, implementation of meditation and chore chart with kids as helpful. MENTAL STATUS EXAM  Mood was euthymic with congruent affect. Suicidal ideation was denied. Homicidal ideation was denied. Hygiene was good . Dress was neat. Behavior was Within Normal Limits with No observation or self-report of difficulties ambulating. Attitude was Cooperative. Eye-contact was good. Speech: rate - WNL, rhythm -  WNL, volume - WNL  Verbalizations were goal directed and coherent. Thought processes were intact and goal-oriented without evidence of delusions, hallucinations, obsessions, or dominga; without significant cognitive distortions. Associations were characterized by intact cognitive processes. Pt was oriented to person, place, time, and general circumstances;  recent:  good. Insight and judgment were estimated to be good, AEB, a good  understanding of cyclical maladaptive patterns, and the ability to use insight to inform behavior change.      ASSESSMENT  Avani Sawyer presented to the appointment today for evaluation and treatment of symptoms of anxiety. She is currently deemed no risk to herself or others and meets criteria for SAGRARIO.   Patient reports improvement in sx severity and frequency over the past few weeks. Has implemented adaptive coping and noticed progress. Would be appropriate to transition to PRN. PHQ Scores 5/24/2021 3/19/2018   PHQ2 Score 0 0   PHQ9 Score 0 0     Interpretation of Total Score Depression Severity: 1-4 = Minimal depression, 5-9 = Mild depression, 10-14 = Moderate depression, 15-19 = Moderately severe depression, 20-27 = Severe depression    SAGRARIO 7 SCORE 6/14/2021 5/24/2021   SAGRARIO-7 Total Score 7 9     Interpretation of SAGRARIO-7 score: 5-9 = mild anxiety, 10-14 = moderate anxiety, 15+ = severe anxiety. Recommend referral to behavioral health for scores 10 or greater. DIAGNOSIS  Avani was seen today for anxiety. Diagnoses and all orders for this visit:    Anxiety      INTERVENTION  Cognitive strategies to target anxiety/stress including labeling, reframing, and thought challenging. MI for overeating and mindful behaviors. PLAN  -f/u as needed       INTERACTIVE COMPLEXITY  Is interactive complexity present?   No  Reason:  N/A  Additional Supporting Information:  N/A     Electronically signed by JE Null on 6/14/21

## 2021-07-04 ENCOUNTER — APPOINTMENT (OUTPATIENT)
Dept: GENERAL RADIOLOGY | Age: 26
End: 2021-07-04
Payer: MEDICAID

## 2021-07-04 ENCOUNTER — HOSPITAL ENCOUNTER (EMERGENCY)
Age: 26
Discharge: HOME | End: 2021-07-04
Attending: EMERGENCY MEDICINE
Payer: MEDICAID

## 2021-07-04 VITALS
HEART RATE: 64 BPM | TEMPERATURE: 97.9 F | HEIGHT: 63 IN | OXYGEN SATURATION: 99 % | WEIGHT: 135 LBS | RESPIRATION RATE: 16 BRPM | BODY MASS INDEX: 23.92 KG/M2 | DIASTOLIC BLOOD PRESSURE: 70 MMHG | SYSTOLIC BLOOD PRESSURE: 114 MMHG

## 2021-07-04 DIAGNOSIS — M79.674 PAIN OF TOE OF RIGHT FOOT: ICD-10-CM

## 2021-07-04 DIAGNOSIS — L84 FOOT CALLUS: Primary | ICD-10-CM

## 2021-07-04 PROCEDURE — 73630 X-RAY EXAM OF FOOT: CPT

## 2021-07-04 PROCEDURE — 99283 EMERGENCY DEPT VISIT LOW MDM: CPT

## 2021-07-04 RX ORDER — IBUPROFEN 600 MG/1
600 TABLET ORAL EVERY 8 HOURS PRN
Qty: 21 TABLET | Refills: 0 | Status: SHIPPED | OUTPATIENT
Start: 2021-07-04 | End: 2021-10-09

## 2021-07-04 ASSESSMENT — PAIN DESCRIPTION - LOCATION: LOCATION: TOE (COMMENT WHICH ONE)

## 2021-07-04 ASSESSMENT — PAIN DESCRIPTION - FREQUENCY: FREQUENCY: CONTINUOUS

## 2021-07-04 ASSESSMENT — PAIN DESCRIPTION - DESCRIPTORS: DESCRIPTORS: SHARP

## 2021-07-04 ASSESSMENT — PAIN DESCRIPTION - PAIN TYPE: TYPE: ACUTE PAIN

## 2021-07-04 ASSESSMENT — PAIN DESCRIPTION - ORIENTATION: ORIENTATION: RIGHT

## 2021-07-04 ASSESSMENT — PAIN SCALES - GENERAL: PAINLEVEL_OUTOF10: 7

## 2021-07-04 NOTE — ED PROVIDER NOTES
HPI:  21, Time: 6:57 PM EDT         Jasson Tabares is a 27 y.o. female presenting to the ED for right pinky toe callous, beginning pta ago. The complaint has been intermittent, mild in severity, and worsened by nothing. 28 yo f w history of migraines present sfor right pinky toe callous that she noticed 4 days ago. She notes area is tender. Denies any known injury. She hasnt taken anything for it. She has never seen podiatry in the past    Review of Systems:   A complete review of systems was performed and pertinent positives and negatives are stated within HPI, all other systems reviewed and are negative.          --------------------------------------------- PAST HISTORY ---------------------------------------------  Past Medical History:  has a past medical history of Anxiety, Chronic sinusitis, and Seasonal allergies. Past Surgical History:  has a past surgical history that includes  section and pr nasal/sinus ndsc surg w/dilation frontal sinus (N/A, 2018). Social History:  reports that she has never smoked. She has never used smokeless tobacco. She reports that she does not drink alcohol and does not use drugs. Family History: family history includes Cancer in her maternal grandmother and paternal grandmother; Diabetes in her paternal grandfather. The patients home medications have been reviewed. Allergies: Bactrim [sulfamethoxazole-trimethoprim]    -------------------------------------------------- RESULTS -------------------------------------------------  All laboratory and radiology results have been personally reviewed by myself   LABS:  No results found for this visit on 21.     RADIOLOGY:  Interpreted by Radiologist.  XR FOOT RIGHT (MIN 3 VIEWS)   Final Result   No acute osseous abnormality.             ------------------------- NURSING NOTES AND VITALS REVIEWED ---------------------------   The nursing notes within the ED encounter and vital signs as below have been reviewed. /70   Pulse 64   Temp 97.9 °F (36.6 °C) (Infrared)   Resp 16   Ht 5' 3\" (1.6 m)   Wt 135 lb (61.2 kg)   SpO2 99%   BMI 23.91 kg/m²   Oxygen Saturation Interpretation: Normal      ---------------------------------------------------PHYSICAL EXAM--------------------------------------    Physical Exam  Vitals reviewed. Constitutional:       General: She is not in acute distress. Appearance: Normal appearance. She is not toxic-appearing. HENT:      Head: Normocephalic and atraumatic. Right Ear: External ear normal.      Left Ear: External ear normal.      Nose: Nose normal. No congestion. Mouth/Throat:      Mouth: Mucous membranes are moist.      Pharynx: Oropharynx is clear. No posterior oropharyngeal erythema. Eyes:      Extraocular Movements: Extraocular movements intact. Pupils: Pupils are equal, round, and reactive to light. Cardiovascular:      Rate and Rhythm: Normal rate and regular rhythm. Pulses: Normal pulses. Dorsalis pedis pulses are 2+ on the right side and 2+ on the left side. Posterior tibial pulses are 2+ on the right side and 2+ on the left side. Heart sounds: No murmur heard. Pulmonary:      Effort: Pulmonary effort is normal.      Breath sounds: No wheezing or rhonchi. Chest:      Chest wall: No tenderness. Abdominal:      General: Bowel sounds are normal.      Tenderness: There is no abdominal tenderness. There is no right CVA tenderness, left CVA tenderness or guarding. Musculoskeletal:         General: No swelling or deformity. Cervical back: Normal range of motion and neck supple. No muscular tenderness. Feet:    Feet:      Right foot:      Skin integrity: Callus and dry skin present. No ulcer, blister, skin breakdown, erythema or warmth. Left foot:      Skin integrity: Skin integrity normal. No callus or dry skin.       Comments: Right pinky toe callous, corn  Skin:     General: Skin is warm and dry. Capillary Refill: Capillary refill takes less than 2 seconds. Neurological:      General: No focal deficit present. Mental Status: She is alert and oriented to person, place, and time. Psychiatric:         Mood and Affect: Mood normal.             ------------------------------ ED COURSE/MEDICAL DECISION MAKING----------------------  Medications - No data to display      ED COURSE:       Medical Decision Making:    Recommend protective band aide, motrin, open shoe and podiatry followup, no FB, no abscess or infection    Counseling: The emergency provider has spoken with the patient and discussed todays results, in addition to providing specific details for the plan of care and counseling regarding the diagnosis and prognosis. Questions are answered at this time and they are agreeable with the plan.      --------------------------------- IMPRESSION AND DISPOSITION ---------------------------------    IMPRESSION  1. Foot callus    2. Pain of toe of right foot        DISPOSITION  Disposition: Discharge to home  Patient condition is good      NOTE: This report was transcribed using voice recognition software.  Every effort was made to ensure accuracy; however, inadvertent computerized transcription errors may be present     Oneil Duncan DO  07/04/21 1930

## 2021-10-05 ENCOUNTER — HOSPITAL ENCOUNTER (EMERGENCY)
Age: 26
Discharge: HOME | End: 2021-10-06
Attending: STUDENT IN AN ORGANIZED HEALTH CARE EDUCATION/TRAINING PROGRAM
Payer: MEDICAID

## 2021-10-05 ENCOUNTER — APPOINTMENT (OUTPATIENT)
Dept: GENERAL RADIOLOGY | Age: 26
End: 2021-10-05
Payer: MEDICAID

## 2021-10-05 VITALS
OXYGEN SATURATION: 99 % | HEART RATE: 72 BPM | DIASTOLIC BLOOD PRESSURE: 70 MMHG | TEMPERATURE: 98.6 F | RESPIRATION RATE: 16 BRPM | SYSTOLIC BLOOD PRESSURE: 117 MMHG | HEIGHT: 63 IN | WEIGHT: 135 LBS | BODY MASS INDEX: 23.92 KG/M2

## 2021-10-05 DIAGNOSIS — L03.012 PARONYCHIA OF FINGER, LEFT: ICD-10-CM
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
## 2021-10-05 PROCEDURE — 99283 EMERGENCY DEPT VISIT LOW MDM: CPT

## 2021-10-05 PROCEDURE — 73140 X-RAY EXAM OF FINGER(S): CPT

## 2021-10-05 RX ORDER — AMOXICILLIN AND CLAVULANATE POTASSIUM 875; 125 MG/1; MG/1
1 TABLET, FILM COATED ORAL 2 TIMES DAILY
Qty: 20 TABLET | Refills: 0 | Status: SHIPPED | OUTPATIENT
Start: 2021-10-05 | End: 2021-10-09 | Stop reason: ALTCHOICE

## 2021-10-05 RX ORDER — AMOXICILLIN AND CLAVULANATE POTASSIUM 875; 125 MG/1; MG/1
1 TABLET, FILM COATED ORAL ONCE
Status: COMPLETED | OUTPATIENT
Start: 2021-10-06 | End: 2021-10-06

## 2021-10-05 ASSESSMENT — PAIN SCALES - GENERAL: PAINLEVEL_OUTOF10: 9

## 2021-10-05 ASSESSMENT — PAIN DESCRIPTION - ORIENTATION: ORIENTATION: LEFT

## 2021-10-05 ASSESSMENT — PAIN DESCRIPTION - LOCATION: LOCATION: FINGER (COMMENT WHICH ONE)

## 2021-10-06 PROCEDURE — 90471 IMMUNIZATION ADMIN: CPT | Performed by: STUDENT IN AN ORGANIZED HEALTH CARE EDUCATION/TRAINING PROGRAM

## 2021-10-06 PROCEDURE — 6370000000 HC RX 637 (ALT 250 FOR IP): Performed by: STUDENT IN AN ORGANIZED HEALTH CARE EDUCATION/TRAINING PROGRAM

## 2021-10-06 PROCEDURE — 90715 TDAP VACCINE 7 YRS/> IM: CPT | Performed by: STUDENT IN AN ORGANIZED HEALTH CARE EDUCATION/TRAINING PROGRAM

## 2021-10-06 PROCEDURE — 6360000002 HC RX W HCPCS: Performed by: STUDENT IN AN ORGANIZED HEALTH CARE EDUCATION/TRAINING PROGRAM

## 2021-10-06 RX ORDER — IBUPROFEN 400 MG/1
400 TABLET ORAL ONCE
Status: COMPLETED | OUTPATIENT
Start: 2021-10-06 | End: 2021-10-06

## 2021-10-06 RX ADMIN — TETANUS TOXOID, REDUCED DIPHTHERIA TOXOID AND ACELLULAR PERTUSSIS VACCINE, ADSORBED 0.5 ML: 5; 2.5; 8; 8; 2.5 SUSPENSION INTRAMUSCULAR at 00:32

## 2021-10-06 RX ADMIN — AMOXICILLIN AND CLAVULANATE POTASSIUM 1 TABLET: 875; 125 TABLET, FILM COATED ORAL at 00:32

## 2021-10-06 RX ADMIN — IBUPROFEN 400 MG: 400 TABLET, FILM COATED ORAL at 00:32

## 2021-10-06 ASSESSMENT — PAIN SCALES - GENERAL: PAINLEVEL_OUTOF10: 8

## 2021-10-06 NOTE — ED PROVIDER NOTES
ED  Provider Note  Admit Date/RoomTime: 10/5/2021 11:27 PM  ED Room: [facility]""E      History of Present Illness:  10/5/21, Time: 11:48 PM EDT  Chief Complaint   Patient presents with    Finger Injury     LIF injured a few days ago. now cant sleep. throbbing and swollen and painful         Olga Siddiqui is a 27 y.o. female presenting to the ED for left index finger pain  Onset: Gradual  Location: Distal left index finger surrounding nail, radial side greater than ulnar side, erythema extends to DIP  Timing: Constant achy pain, mild to moderate,  Duration: Days  Associated symptoms: No known traumatic etiology precipitating development of swelling, but does state that her dog bit the finger after development of erythema, unknown tetanus status, no numbness or tingling   severity: Severe pain, throbbing  Exacerbated by: None  Relieved by: None        Review of Systems:   A complete review of systems was performed and pertinent positives and negatives are stated within HPI, all other systems reviewed and are negative.        --------------------------------------------- PATIENT HISTORY--------------------------------------------  Past Medical History:  has a past medical history of Anxiety, Chronic sinusitis, and Seasonal allergies. Past Surgical History:  has a past surgical history that includes  section and pr nasal/sinus ndsc surg w/dilation frontal sinus (N/A, 2018). Family History: family history includes Cancer in her maternal grandmother and paternal grandmother; Diabetes in her paternal grandfather. . Unless otherwise noted, family history is non contributory    Social History:  reports that she has never smoked. She has never used smokeless tobacco. She reports that she does not drink alcohol and does not use drugs. The patients home medications have been reviewed.     Allergies: Bactrim [sulfamethoxazole-trimethoprim]    I have reviewed the past medical history, past surgical history, records and past encounters were reviewed. ------------------------------ ED COURSE/MEDICAL DECISION MAKING----------------------  Medications   Tetanus-Diphth-Acell Pertussis (BOOSTRIX) injection 0.5 mL (has no administration in time range)   amoxicillin-clavulanate (AUGMENTIN) 875-125 MG per tablet 1 tablet (has no administration in time range)     I, Dr. Ashleigh Boyer, am the primary provider of record    Medical Decision Making:   Cellulitis versus developing paronychia, but no area of fluctuance that would be an sizable/drainable at this time. With the dog bite, will replete tetanus and give Augmentin for probable cellulitis. X-rays to rule out fracture underlying. ED Counseling: This emergency provider has spoken with the patient and any family present to discuss clinical status, results, plan of care, diagnosis and prognosis as able to be determined at this time. Any questions were answered and patient and/or family/POA are agreeable with the plan.       --------------------------------- IMPRESSION AND DISPOSITION ---------------------------------    IMPRESSION  1. Dog bite, initial encounter    2. Paronychia of finger, left        DISPOSITION  Disposition: Discharge to home  Patient condition is good      This report was transcribed using voice recognition software. Every effort was made to ensure accuracy; however, transcription errors may be present.          Jennifer Vaughn MD  10/06/21 [id number]

## 2021-10-06 NOTE — ED NOTES
Patient declined dressing on finger. Skin intact. Medications given.      Carmelo Merritt RN  10/06/21 7839

## 2021-10-09 ENCOUNTER — HOSPITAL ENCOUNTER (EMERGENCY)
Age: 26
Discharge: HOME | End: 2021-10-09
Attending: FAMILY MEDICINE
Payer: MEDICAID

## 2021-10-09 VITALS
HEIGHT: 62 IN | RESPIRATION RATE: 16 BRPM | BODY MASS INDEX: 27.27 KG/M2 | HEART RATE: 60 BPM | SYSTOLIC BLOOD PRESSURE: 120 MMHG | WEIGHT: 148.2 LBS | OXYGEN SATURATION: 98 % | TEMPERATURE: 97.8 F | DIASTOLIC BLOOD PRESSURE: 80 MMHG

## 2021-10-09 DIAGNOSIS — L03.012 PARONYCHIA OF LEFT INDEX FINGER: Primary | ICD-10-CM

## 2021-10-09 PROCEDURE — 99285 EMERGENCY DEPT VISIT HI MDM: CPT

## 2021-10-09 PROCEDURE — 6370000000 HC RX 637 (ALT 250 FOR IP): Performed by: FAMILY MEDICINE

## 2021-10-09 PROCEDURE — 10060 I&D ABSCESS SIMPLE/SINGLE: CPT

## 2021-10-09 RX ORDER — DIAPER,BRIEF,INFANT-TODD,DISP
EACH MISCELLANEOUS ONCE
Status: COMPLETED | OUTPATIENT
Start: 2021-10-09 | End: 2021-10-09

## 2021-10-09 RX ORDER — DOXYCYCLINE HYCLATE 100 MG
100 TABLET ORAL 2 TIMES DAILY
Qty: 20 TABLET | Refills: 0 | Status: SHIPPED | OUTPATIENT
Start: 2021-10-09 | End: 2021-10-19

## 2021-10-09 RX ORDER — CLINDAMYCIN HYDROCHLORIDE 150 MG/1
300 CAPSULE ORAL ONCE
Status: COMPLETED | OUTPATIENT
Start: 2021-10-09 | End: 2021-10-09

## 2021-10-09 RX ORDER — CLINDAMYCIN HYDROCHLORIDE 300 MG/1
300 CAPSULE ORAL 3 TIMES DAILY
Qty: 30 CAPSULE | Refills: 0 | Status: SHIPPED | OUTPATIENT
Start: 2021-10-09 | End: 2021-10-19

## 2021-10-09 RX ADMIN — BACITRACIN ZINC: 500 OINTMENT TOPICAL at 06:49

## 2021-10-09 RX ADMIN — CLINDAMYCIN HYDROCHLORIDE 300 MG: 150 CAPSULE ORAL at 06:49

## 2021-10-09 ASSESSMENT — PAIN SCALES - GENERAL: PAINLEVEL_OUTOF10: 8

## 2021-10-09 ASSESSMENT — PAIN DESCRIPTION - PAIN TYPE: TYPE: ACUTE PAIN

## 2021-10-09 ASSESSMENT — PAIN DESCRIPTION - LOCATION: LOCATION: FINGER (COMMENT WHICH ONE)

## 2021-10-09 ASSESSMENT — PAIN DESCRIPTION - ORIENTATION: ORIENTATION: LEFT

## 2021-10-09 ASSESSMENT — PAIN DESCRIPTION - DESCRIPTORS: DESCRIPTORS: THROBBING;CONSTANT

## 2021-10-09 NOTE — ED PROVIDER NOTES
[address] [facility]  Department of Emergency Medicine   ED  Encounter Note  Admit Date/RoomTime: 10/9/2021  6:04 AM  ED Room:     NAME: Chris Bragg  : 1995  MRN: [id number]     Chief Complaint:  Other (was seen on 10/5/21, for a paronychia of the left index finger. Pt reports increased pain and wants it \"lanced open\")    History of Present Illness        Avani Hanna is a 27 y.o. old female who presents to the emergency department by private vehicle, for gradual onset, worsening tender area on left index finger, which occured 4 day(s) prior to arrival.  Since onset the symptoms have been gradually worsening, associated with pain and swelling with redness and moderate in severity. She has a history of paronychia and is on Augmentin since 10/5  No numbness . No pain with movement. ROS   Pertinent positives and negatives are stated within HPI, all other systems reviewed and are negative. Past Medical History:  has a past medical history of Anxiety, Chronic sinusitis, and Seasonal allergies. Surgical History:  has a past surgical history that includes  section and pr nasal/sinus ndsc surg w/dilation frontal sinus (N/A, 2018). Social History:  reports that she has never smoked. She has never used smokeless tobacco. She reports that she does not drink alcohol and does not use drugs. Family History: family history includes Cancer in her maternal grandmother and paternal grandmother; Diabetes in her paternal grandfather. Allergies: Bactrim [sulfamethoxazole-trimethoprim]    Physical Exam   Oxygen Saturation Interpretation: Normal.        ED Triage Vitals [10/09/21 0610]   BP Temp Temp Source Pulse Resp SpO2 Height Weight   122/78 97.8 °F (36.6 °C) Temporal 57 16 98 % 5' 2\" (1.575 m) 148 lb 3.2 oz (67.2 kg)         Constitutional:  Alert, development consistent with age. HEENT:  NC/NT. Airway patent  Neck:  Normal ROM. Supple.   Respiratory:  Clear to auscultation and breath sounds equal.  CV:  Regular rate and rhythm, normal heart sounds, without pathological murmurs, ectopy, gallops, or rubs. GI:  Abdomen Soft, nontender, good bowel sounds. No firm or pulsatile mass. Back:  No costovertebral tenderness. Extremities: No tenderness or edema noted. Integument:  Normal turgor. Warm, dry. Erythema swelling and tenderness approx 1/2 upper area   Lymphatics: No lymphangitis or adenopathy noted. Neurological:  Oriented. Motor functions intact. Lab / Imaging Results   (All laboratory and radiology results have been personally reviewed by myself)  Labs:  No results found for this visit on 10/09/21. Imaging: All Radiology results interpreted by Radiologist unless otherwise noted. No orders to display       ED Course / Medical Decision Making   Medications - No data to display     Re-examination:  10/9/21       Time: 6:30  Patients condition is improving after treatment. Consult(s):   None    Procedure(s):  PROCEDURE  10/9/21       Time: 6:20    INCISION AND DRAINAGE  Risks, benefits and alternatives (for applicable procedures below) described. Performed By: Chidi Douglas MD.    Indication: Abscess located on left index paronychial area  Informed consent obtained: The patient provided verbal consent for this procedure. .  Prep: The skin was cleansed with povidone iodine and draped in a sterile fashion. Local Anesthesia:  obtained with Topical Ethyl Chloride. Incision: The Abscess located on left index was Incised by scalpal and bloody fluid was drained. A wound culture was not obtained. The wound  was not irrigated and was not packed with iodoform gauze. The wound was then covered with a sterile dressing. Patient tolerated the procedure well.      6:45 A less pressure feeling better bleeding stopped will change to doxycycline and clindamycin follow-up in 2 days or sooner if worsens    MDM:      Plan is for treatment with analgesics, ATB's and appropriate outpatient follow-up. Patient is urged to take all ATB to completion unless and adverse reaction develops. Plan of Care/Counseling:  Chidi Douglas MD reviewed today's visit with the patient in addition to providing specific details for the plan of care and counseling regarding the diagnosis and prognosis. Questions are answered at this time and are agreeable with the plan. Assessment      1. Paronychia of left index finger      Plan   Discharged home  Patient condition is good    New Medications     New Prescriptions    No medications on file     Electronically signed by Chidi Douglas MD   DD: 10/9/21  **This report was transcribed using voice recognition software. Every effort was made to ensure accuracy; however, inadvertent computerized transcription errors may be present.   END OF ED PROVIDER NOTE        Chidi Douglas MD  10/09/21 [id number]

## 2021-10-09 NOTE — ED NOTES
Paronychia cleaned, frozen, and incised by Dr. Areli Cantu. Wound cleaned and Bacitracin applied to telfa pad and wrapped with maribel guaze. Pt tolerated well. Pt instructed on wound care. Pt verbalized understanding.       Ruben Flower RN  10/09/21 0700

## 2021-12-10 DIAGNOSIS — F41.9 ANXIETY: ICD-10-CM

## 2021-12-10 RX ORDER — SERTRALINE HYDROCHLORIDE 100 MG/1
100 TABLET, FILM COATED ORAL DAILY
Qty: 90 TABLET | Refills: 1 | Status: SHIPPED
Start: 2021-12-10 | End: 2021-12-28 | Stop reason: SDUPTHER

## 2021-12-28 ENCOUNTER — OFFICE VISIT (OUTPATIENT)
Dept: FAMILY MEDICINE CLINIC | Age: 26
End: 2021-12-28
Payer: MEDICAID

## 2021-12-28 VITALS
DIASTOLIC BLOOD PRESSURE: 71 MMHG | RESPIRATION RATE: 16 BRPM | WEIGHT: 155.8 LBS | SYSTOLIC BLOOD PRESSURE: 102 MMHG | TEMPERATURE: 97.2 F | HEIGHT: 63 IN | BODY MASS INDEX: 27.61 KG/M2 | OXYGEN SATURATION: 99 % | HEART RATE: 70 BPM

## 2021-12-28 DIAGNOSIS — F41.9 ANXIETY: ICD-10-CM

## 2021-12-28 PROCEDURE — 1036F TOBACCO NON-USER: CPT | Performed by: FAMILY MEDICINE

## 2021-12-28 PROCEDURE — G8419 CALC BMI OUT NRM PARAM NOF/U: HCPCS | Performed by: FAMILY MEDICINE

## 2021-12-28 PROCEDURE — 99214 OFFICE O/P EST MOD 30 MIN: CPT | Performed by: FAMILY MEDICINE

## 2021-12-28 PROCEDURE — G8427 DOCREV CUR MEDS BY ELIG CLIN: HCPCS | Performed by: FAMILY MEDICINE

## 2021-12-28 PROCEDURE — G8484 FLU IMMUNIZE NO ADMIN: HCPCS | Performed by: FAMILY MEDICINE

## 2021-12-28 RX ORDER — SUMATRIPTAN 25 MG/1
25 TABLET, FILM COATED ORAL
Qty: 27 TABLET | Refills: 3 | Status: SHIPPED | OUTPATIENT
Start: 2021-12-28 | End: 2022-08-08

## 2021-12-28 RX ORDER — FLUTICASONE PROPIONATE 50 MCG
SPRAY, SUSPENSION (ML) NASAL
COMMUNITY
Start: 2021-11-28 | End: 2022-09-19

## 2021-12-28 RX ORDER — SUMATRIPTAN 25 MG/1
TABLET, FILM COATED ORAL
COMMUNITY
Start: 2021-11-28 | End: 2021-12-28 | Stop reason: SDUPTHER

## 2021-12-28 RX ORDER — LORATADINE 10 MG/1
TABLET ORAL
COMMUNITY
Start: 2021-11-28 | End: 2022-06-03

## 2021-12-28 RX ORDER — SERTRALINE HYDROCHLORIDE 100 MG/1
200 TABLET, FILM COATED ORAL DAILY
Qty: 180 TABLET | Refills: 3 | Status: SHIPPED | OUTPATIENT
Start: 2021-12-28

## 2021-12-28 NOTE — PROGRESS NOTES
12/29/2021    Chief Complaint   Patient presents with    Discuss Medications       HPI    Rasheeda Rodriguez is a 27 y.o. patient that presents today for:    Anxiety and/or Depression:  Patient is here with complaints of anxiety and/or depression. Patient does not have suicidal or homicidal ideation. Has little interest in doing activities. Patient is not having issues falling or staying asleep. Patient is not having associated panic attacks. Patient does not use alcohol and/or drugs. Would like to increase sertraline to 200 mg. Weight is up 20 lbs since October. Patient's past medical, surgical, social and/or family history reviewed, updated in chart, and are non-contributory (unless otherwise stated). Medications and allergies also reviewed and updated in chart. ROS negative unless otherwise specified    Physical Exam  Temp Readings from Last 3 Encounters:   12/28/21 97.2 °F (36.2 °C) (Temporal)   10/09/21 97.8 °F (36.6 °C) (Oral)   10/05/21 98.6 °F (37 °C) (Oral)     Wt Readings from Last 3 Encounters:   12/28/21 155 lb 12.8 oz (70.7 kg)   10/09/21 148 lb 3.2 oz (67.2 kg)   10/05/21 135 lb (61.2 kg)     BP Readings from Last 3 Encounters:   12/28/21 102/71   10/09/21 120/80   10/05/21 117/70     Pulse Readings from Last 3 Encounters:   12/28/21 70   10/09/21 60   10/05/21 72       General appearance: alert, well appearing, and in no distress, oriented to person, place, and time and normal appearing weight. CVS exam: normal rate, regular rhythm, normal S1, S2, no murmurs, rubs, clicks or gallops. Radial pulses 2+ bilateral.  PT/DP pulse 2+ bilat. No C/C/E    Chest: clear to auscultation, no wheezes, rales or rhonchi, symmetric air entry.      Abdomen: Soft, non-tender, non-distended, positive BS in all 4 quadrants    Extremities:Dorsalis pedis pulses palpated bilaterally, no clubbing, cyanosis, edema or erythema,     SKIN: no lesions, jaundice, petechiae, pallor, cyanosis, ecchymosis    NEURO: gross motor exam normal by observation, gait normal    Mental status - alert, oriented to person, place, and time, normal mood, behavior, speech, dress, motor activity, and thought processes      Assessment/Plan  Avani was seen today for discuss medications. Diagnoses and all orders for this visit:    Anxiety  -     sertraline (ZOLOFT) 100 MG tablet; Take 2 tablets by mouth daily    Other orders  -     SUMAtriptan (IMITREX) 25 MG tablet; Take 1 tablet by mouth once as needed for Migraine          Return in about 1 year (around 12/28/2022), or if symptoms worsen or fail to improve. Robyn Wu, DO    Call or go to ED immediately if symptoms worsen or persist.    Educational materials and/or home exercises printed for patient's review and were included in patient instructions on his/her After Visit Summary and given to patient at the end of visit. Counseled regarding above diagnosis, including possible risks and complications,  especially if left uncontrolled. Counseled regarding the possible side effects, risks, benefits and alternatives to treatment; patient and/or guardian verbalizes understanding, agrees, feels comfortable with and wishes to proceed with above treatment plan. Advised patient to call with any new medication issues, and read all Rx info from pharmacy to assure aware of all possible risks and side effects of medication before taking. Reviewed age and gender appropriate health screening exams and vaccinations. Advised patient regarding importance of keeping up with recommended health maintenance and to schedule as soon as possible if overdue, as this is important in assessing for undiagnosed pathology, especially cancer, as well as protecting against potentially harmful/life threatening disease. Patient and/or guardian verbalizes understanding and agrees with above counseling, assessment and plan. All questions answered.

## 2022-01-26 ENCOUNTER — TELEPHONE (OUTPATIENT)
Dept: FAMILY MEDICINE CLINIC | Age: 27
End: 2022-01-26

## 2022-01-26 DIAGNOSIS — J32.1 CHRONIC FRONTAL SINUSITIS: Primary | ICD-10-CM

## 2022-02-08 ENCOUNTER — PATIENT MESSAGE (OUTPATIENT)
Dept: FAMILY MEDICINE CLINIC | Age: 27
End: 2022-02-08

## 2022-02-08 NOTE — TELEPHONE ENCOUNTER
From: Cecy Washington  Sent: 2/8/2022 11:19 AM EST  To: [facility] Clinical Staff  Subject: zoloft    also if they only come in 100mg 2 a day also can you do a 90 supply like before.  if you check my aftercare papers it does state the change to 200mg of zoloft thank you

## 2022-02-19 ENCOUNTER — HOSPITAL ENCOUNTER (OUTPATIENT)
Dept: GENERAL RADIOLOGY | Age: 27
Discharge: HOME | End: 2022-02-21
Payer: MEDICAID

## 2022-02-19 ENCOUNTER — HOSPITAL ENCOUNTER (OUTPATIENT)
Age: 27
Discharge: HOME | End: 2022-02-21
Payer: MEDICAID

## 2022-02-19 DIAGNOSIS — J32.1 CHRONIC FRONTAL SINUSITIS: ICD-10-CM

## 2022-02-19 PROCEDURE — 70210 X-RAY EXAM OF SINUSES: CPT

## 2022-02-21 DIAGNOSIS — J32.8 OTHER CHRONIC SINUSITIS: Primary | ICD-10-CM

## 2022-02-21 RX ORDER — CEFDINIR 300 MG/1
300 CAPSULE ORAL 2 TIMES DAILY
Qty: 20 CAPSULE | Refills: 0 | Status: SHIPPED | OUTPATIENT
Start: 2022-02-21 | End: 2022-03-03

## 2022-04-01 ENCOUNTER — OFFICE VISIT (OUTPATIENT)
Dept: ENT CLINIC | Age: 27
End: 2022-04-01
Payer: MEDICAID

## 2022-04-01 VITALS
HEIGHT: 63 IN | SYSTOLIC BLOOD PRESSURE: 118 MMHG | OXYGEN SATURATION: 98 % | TEMPERATURE: 98 F | WEIGHT: 145 LBS | HEART RATE: 52 BPM | DIASTOLIC BLOOD PRESSURE: 72 MMHG | BODY MASS INDEX: 25.69 KG/M2

## 2022-04-01 DIAGNOSIS — J34.3 HYPERTROPHY OF BOTH INFERIOR NASAL TURBINATES: ICD-10-CM

## 2022-04-01 DIAGNOSIS — J32.9 CHRONIC SINUSITIS, UNSPECIFIED LOCATION: Primary | ICD-10-CM

## 2022-04-01 PROCEDURE — G8419 CALC BMI OUT NRM PARAM NOF/U: HCPCS | Performed by: OTOLARYNGOLOGY

## 2022-04-01 PROCEDURE — 99213 OFFICE O/P EST LOW 20 MIN: CPT | Performed by: OTOLARYNGOLOGY

## 2022-04-01 PROCEDURE — G8427 DOCREV CUR MEDS BY ELIG CLIN: HCPCS | Performed by: OTOLARYNGOLOGY

## 2022-04-01 PROCEDURE — 1036F TOBACCO NON-USER: CPT | Performed by: OTOLARYNGOLOGY

## 2022-04-01 ASSESSMENT — ENCOUNTER SYMPTOMS
COUGH: 0
NAUSEA: 0
EYE REDNESS: 0
RHINORRHEA: 1
SINUS PRESSURE: 1
ALLERGIC/IMMUNOLOGIC NEGATIVE: 1
COLOR CHANGE: 0
VOMITING: 0
SINUS PAIN: 1
STRIDOR: 0
SHORTNESS OF BREATH: 0
EYE DISCHARGE: 0

## 2022-04-01 NOTE — PROGRESS NOTES
Subjective:      Patient ID:  Farida Lorenzana is a 27 y.o. female. HPI:  Pt returns for recheck of sinuses. Pt has FESS/SMRIT 5 years ago and has been doing well since. For the last 1-2 months she has had a sinus infection that has not gone away with pressure, purulent drainage, and XR confirmed acute on chronic sinusitis. ABX and Flonase without improvement. Patient's medications, allergies, past medical,surgical, social and family histories were reviewed and updated as appropriate. Review of Systems   Constitutional: Negative for chills, fatigue and fever. HENT: Positive for congestion, postnasal drip, rhinorrhea, sinus pressure and sinus pain. Eyes: Negative for discharge and redness. Respiratory: Negative for cough, shortness of breath and stridor. Gastrointestinal: Negative for nausea and vomiting. Endocrine: Negative. Genitourinary: Negative. Musculoskeletal: Negative. Skin: Negative for color change and rash. Allergic/Immunologic: Negative. Neurological: Negative for dizziness, speech difficulty and headaches. Hematological: Negative. Psychiatric/Behavioral: Negative for agitation and confusion. All other systems reviewed and are negative. Objective:   Physical Exam  Constitutional:       Appearance: Normal appearance. HENT:      Head: Normocephalic and atraumatic. Right Ear: External ear normal.      Left Ear: External ear normal.      Nose: Congestion present. Mouth/Throat:      Mouth: Mucous membranes are moist.   Eyes:      Pupils: Pupils are equal, round, and reactive to light. Cardiovascular:      Rate and Rhythm: Normal rate. Musculoskeletal:      Cervical back: Normal range of motion. Skin:     General: Skin is warm and dry. Neurological:      Mental Status: She is alert. Assessment:      Diagnosis Orders   1. Chronic sinusitis, unspecified location     2.  Hypertrophy of both inferior nasal turbinates Plan:   Acute on chronic sinusitis failure to respond to abx and Flonase  Recommend CT sinus  Follow up in 2-4 weeks                      Avani Ramon  1995    I have discussed the case, including pertinent history and exam findings with the resident. I have seen and examined the patient and the key elements of the encounter have been performed by me. I agree with the assessment, plan and orders as documented by the  resident              Remainder of medical problems as per  resident note. Patient seen and examined. Agree with above exam, assessment and plan.       Electronically signed by Zenobia Mckinney DO on 5/9/22 at 6:59 AM EDT

## 2022-04-04 ENCOUNTER — TELEPHONE (OUTPATIENT)
Dept: ENT CLINIC | Age: 27
End: 2022-04-04

## 2022-04-04 NOTE — TELEPHONE ENCOUNTER
[facility] to authorize order with patient insurance. Patient is scheduled for sinus CT with radiology on 4/28/22 @ 11:00am. Patient has been notified of date and time and that they need to arrive at 10:30am. Patient was informed she has no prep prior to procedure. Patient instructed to park in [facility] parking lot and report to registration. Detail voicemail left for patient.     Electronically signed by Mehran Martinez MA on 4/4/22 at 1:35 PM EDT

## 2022-05-02 ENCOUNTER — HOSPITAL ENCOUNTER (OUTPATIENT)
Dept: CT IMAGING | Age: 27
Discharge: HOME | End: 2022-05-04
Payer: MEDICAID

## 2022-05-02 DIAGNOSIS — J32.9 CHRONIC SINUSITIS, UNSPECIFIED LOCATION: ICD-10-CM

## 2022-05-02 PROCEDURE — 70486 CT MAXILLOFACIAL W/O DYE: CPT

## 2022-05-13 ENCOUNTER — OFFICE VISIT (OUTPATIENT)
Dept: ENT CLINIC | Age: 27
End: 2022-05-13
Payer: MEDICAID

## 2022-05-13 VITALS
DIASTOLIC BLOOD PRESSURE: 77 MMHG | OXYGEN SATURATION: 99 % | HEART RATE: 43 BPM | HEIGHT: 63 IN | TEMPERATURE: 97.9 F | SYSTOLIC BLOOD PRESSURE: 121 MMHG | WEIGHT: 140 LBS | BODY MASS INDEX: 24.8 KG/M2

## 2022-05-13 DIAGNOSIS — J34.3 HYPERTROPHY OF BOTH INFERIOR NASAL TURBINATES: ICD-10-CM

## 2022-05-13 DIAGNOSIS — Z98.890 S/P FESS (FUNCTIONAL ENDOSCOPIC SINUS SURGERY): ICD-10-CM

## 2022-05-13 DIAGNOSIS — J32.9 CHRONIC SINUSITIS, UNSPECIFIED LOCATION: Primary | ICD-10-CM

## 2022-05-13 PROCEDURE — 99213 OFFICE O/P EST LOW 20 MIN: CPT | Performed by: OTOLARYNGOLOGY

## 2022-05-13 PROCEDURE — 1036F TOBACCO NON-USER: CPT | Performed by: OTOLARYNGOLOGY

## 2022-05-13 PROCEDURE — G8420 CALC BMI NORM PARAMETERS: HCPCS | Performed by: OTOLARYNGOLOGY

## 2022-05-13 PROCEDURE — G8427 DOCREV CUR MEDS BY ELIG CLIN: HCPCS | Performed by: OTOLARYNGOLOGY

## 2022-05-13 ASSESSMENT — ENCOUNTER SYMPTOMS
VOMITING: 0
SINUS PRESSURE: 1
COLOR CHANGE: 0
SHORTNESS OF BREATH: 0
ALLERGIC/IMMUNOLOGIC NEGATIVE: 1
STRIDOR: 0
RHINORRHEA: 1
COUGH: 0
EYE REDNESS: 0
SINUS PAIN: 1
NAUSEA: 0
EYE DISCHARGE: 0

## 2022-05-13 NOTE — PROGRESS NOTES
Subjective:      Patient ID:  Krissy Conner is a 32 y.o. female. HPI:    Pt returns for review of CT Sinus. There are not changes since last visit. Pt has been on fluticasone (Flonase) for 1 month(s) and is doing adequately      Past Medical History:   Diagnosis Date    Anxiety     Chronic sinusitis     for OR 18     Seasonal allergies      Past Surgical History:   Procedure Laterality Date     SECTION      x2 - last      NE NASAL/SINUS NDSC SURG W/DILATION FRONTAL SINUS N/A 2018    SINUS ENDOSCOPY FUNCTIONAL SURGERY, SUBMUCOSAL RESECTION INFERIOR TURBINATES, BILATERAL MAXILLARY, FRONTAL AND SPHENOID ANTROSTOMIES performed by Shakila Liu,  at [facility] OR     Family History   Problem Relation Age of Onset    Cancer Maternal Grandmother         lung    Cancer Paternal Grandmother         metastatic    Diabetes Paternal Grandfather      Social History     Socioeconomic History    Marital status: Single     Spouse name: None    Number of children: None    Years of education: None    Highest education level: None   Occupational History     Employer: NOT EMPLOYED    Occupation:    Tobacco Use    Smoking status: Never Smoker    Smokeless tobacco: Never Used   Vaping Use    Vaping Use: Never used   Substance and Sexual Activity    Alcohol use: No    Drug use: No    Sexual activity: Yes     Partners: Male   Other Topics Concern    None   Social History Narrative    ** Merged History Encounter **          Social Determinants of Health     Financial Resource Strain:     Difficulty of Paying Living Expenses: Not on file   Food Insecurity:     Worried About Running Out of Food in the Last Year: Not on file    Candis of Food in the Last Year: Not on file   Transportation Needs:     Lack of Transportation (Medical): Not on file    Lack of Transportation (Non-Medical):  Not on file   Physical Activity:     Days of Exercise per Week: Not on file    Minutes of Exercise per Session: Not on file   Stress:     Feeling of Stress : Not on file   Social Connections:     Frequency of Communication with Friends and Family: Not on file    Frequency of Social Gatherings with Friends and Family: Not on file    Attends Mormonism Services: Not on file    Active Member of Clubs or Organizations: Not on file    Attends Club or Organization Meetings: Not on file    Marital Status: Not on file   Intimate Partner Violence:     Fear of Current or Ex-Partner: Not on file    Emotionally Abused: Not on file    Physically Abused: Not on file    Sexually Abused: Not on file   Housing Stability:     Unable to Pay for Housing in the Last Year: Not on file    Number of [place] in the Last Year: Not on file    Unstable Housing in the Last Year: Not on file     Allergies   Allergen Reactions    Bactrim [Sulfamethoxazole-Trimethoprim] Rash       Review of Systems   Constitutional: Negative for chills, fatigue and fever. HENT: Positive for congestion, postnasal drip, rhinorrhea, sinus pressure and sinus pain. Eyes: Negative for discharge and redness. Respiratory: Negative for cough, shortness of breath and stridor. Gastrointestinal: Negative for nausea and vomiting. Endocrine: Negative. Genitourinary: Negative. Musculoskeletal: Negative. Skin: Negative for color change and rash. Allergic/Immunologic: Negative. Neurological: Negative for dizziness, speech difficulty and headaches. Hematological: Negative. Psychiatric/Behavioral: Negative for agitation and confusion. All other systems reviewed and are negative. Objective:     Vitals:    05/13/22 1126   BP: 121/77   Pulse: (!) 43   Temp: 97.9 °F (36.6 °C)   SpO2: 99%     Physical Exam  Constitutional:       Appearance: Normal appearance. HENT:      Head: Normocephalic and atraumatic. Right Ear: External ear normal.      Left Ear: External ear normal.      Nose: Congestion present. Mouth/Throat:      Mouth: Mucous membranes are moist.   Eyes:      Pupils: Pupils are equal, round, and reactive to light. Cardiovascular:      Rate and Rhythm: Normal rate. Musculoskeletal:      Cervical back: Normal range of motion. Skin:     General: Skin is warm and dry. Neurological:      Mental Status: She is alert. CT sinuses (my review)    yes - jeremy cells - bilateral  no - jocelyne bullosa -   yes - Enlarged inferior turbinates - bilateral   yes - Obstructed OMC -left   yes - maxillary sinus disease -left, severe  no - frontal sinus disease- ,   Left frontal sinus   Absent -  no   Size - Large  Right frontal sinus   Absent -  no   Size - Large  no - sphenoid sinus disease - ,  no - Deviated nasal septum - ,     no - Septal Spur - ,   no - posterior accessory os -,       Impression   Bilateral maxillary sinusitis, left greater than right.                         Assessment:       Diagnosis Orders   1. Chronic sinusitis, unspecified location     2. Hypertrophy of both inferior nasal turbinates     3. S/P FESS (functional endoscopic sinus surgery)                Plan:      I recommend:    Revision Functional endoscopic sinus surgery with bilateral maxillary, frontal and sphenoid antrostomies, submucous resection of inferior turbinates. The procedure risks and benefits were discussed with the patient and family. Pt and family understood and decided to proceed with the surgery. Surgical risks include:    --Injury to the lining of the brain, meningitis, stroke and death - most common in the approach to the frontal sinus but may also occur in operations on the sphenoid and ethmoid sinuses  --Injury to the carotid artery - most common in sphenoid sinus surgery  --Injury to the orbit, eye and eye muscles. Most commonly the medial rectus muscle is injured, this can cause double vision. The optic nerve can be injured during sphenoid sinus surgery.    Injury to the Nasolacrimal Duct can cause tearing. -- Bleeding or air in the orbit. If this happens, blindness can occur due to pressure. Relief of the pressure is mandatory to prevent loss of sight.         Follow up 1 week after surgery

## 2022-05-13 NOTE — PATIENT INSTRUCTIONS
Thank you for choosing our [facility]. We are committed to your medical treatment and  care. If you need to reschedule or cancel your surgery or follow up  appointment, please call the surgery scheduler at [phone]. INSTRUCTIONS FOR SURGERY Revision FESS  SMRITS     Nothing to eat or drink after midnight the night before surgery unless surgery is at [facility] or otherwise instructed by the hospital.    DO NOT TAKE ANY ASPIRIN PRODUCTS 7 days prior to surgery-unless required by your cardiologist or primary care physician. Tylenol only. No Advil, Motrin, Aleve, or Ibuprofen    Any illegal drugs in your system (including Marijuana even if legally prescribed) will result in your surgery being cancelled. Please be sure to check with our office or the hospital on time frame for the drugs to be out of your system. Should your insurance change at any time you must contact our office. Failure to do so may result in your surgery being rescheduled. If you need paperwork filled out for work, you must give the office 2 weeks to complete and submit the forms. [address], [address] [facility] will call you a couple days prior to your surgery and give you further instructions, if any questions call them at Marguerite Marquez [facility]), . [address] [place] [address] will call you the day prior to your surgery and give you further instructions, if any questions call them at [phone].      ? Pre-Surgery/Anesthesia Video ([facility] ONLY)  Located on [facility]  Steps to locate video online:  1. Scroll over Health Information  1. Select Audio and Video  2. Select [employer]  1. Your Child and Anesthesia  2.  Pre Surgery Tour -- VTM Restrictions ([facility] ONLY)   Food Type Stop Prior to Surgery   Solid Food/Milk Products 8 Hours   Formula 6 Hours   Breast Milk 4 Hours Clear Liquids   (Water, Gatorade, Pedialtye) 2 Hours

## 2022-06-03 RX ORDER — LORATADINE 10 MG/1
TABLET ORAL
Qty: 30 TABLET | Refills: 5 | Status: SHIPPED | OUTPATIENT
Start: 2022-06-03

## 2022-08-03 ENCOUNTER — TELEPHONE (OUTPATIENT)
Dept: ENT CLINIC | Age: 27
End: 2022-08-03

## 2022-08-03 NOTE — TELEPHONE ENCOUNTER
Prior Authorization Form:      DEMOGRAPHICS:                     Patient Name:  Pooja Cary  Patient :  1995            Insurance:  Payor: [facility] / Plan: [employer] / Product Type: *No Product type* /   Insurance ID Number:    Payer/Plan Subscr  Sex Relation Sub.  Ins. ID Effective Group Num   1. [facility] 1995 Female Self [id number]                                   [address]         DIAGNOSIS & PROCEDURE:                       Procedure/Operation: Revision FESS SMRITS            CPT Code: 66984,12863    Diagnosis:  chronic sinusitis    ICD10 Code: J32.0    Location:  [facility]    Surgeon:  nimco    SCHEDULING INFORMATION:                          Date: 22    Time: n/a              Anesthesia:  General                                                       Status:  Outpatient        Special Comments:  include all chart notes       Electronically signed by Serg Domínguez on 8/3/2022 at 10:21 AM

## 2022-08-04 NOTE — PROGRESS NOTES
[facility] PRE-ADMISSION TESTING INSTRUCTIONS    The Preadmission Testing patient is instructed accordingly using the following criteria (check applicable):    ARRIVAL INSTRUCTIONS:  [x] Parking the day of Surgery is located in the Main Entrance lot. Upon entering the door, make an immediate right to the surgery reception desk    [x] Bring photo ID and insurance card    [] Bring in a copy of Living will or Durable Power of  papers. [x] Please be sure to arrange for responsible adult to provide transportation to and from the hospital    [x] Please arrange for responsible adult to be with you for the 24 hour period post procedure due to having anesthesia    [x] If you awake am of surgery not feeling well or have temperature >100 please call [phone]    GENERAL INSTRUCTIONS:    [x] Nothing by mouth after midnight, including gum, candy, mints or water    [x] You may brush your teeth, but do not swallow any water    [] Take medications as instructed with 1-2 oz of water    [] Stop herbal supplements and vitamins 5 days prior to procedure    [] Follow preop dosing of blood thinners per physician instructions    [] Take 1/2 dose of evening insulin, but no insulin after midnight    [] No oral diabetic medications after midnight    [] If diabetic and have low blood sugar or feel symptomatic, take 1-2oz apple juice only    [] Bring inhalers day of surgery    [] Bring C-PAP/ Bi-Pap day of surgery    [x] Bring urine specimen day of surgery    [x] Shower or bath with soap, lather and rinse well, AM of Surgery, no lotion, powders or creams to surgical site    [] Follow bowel prep as instructed per surgeon    [x] No tobacco products within 24 hours of surgery     [x] No alcohol or illegal drug use within 24 hours of surgery.     [x] Jewelry, body piercing's, eyeglasses, contact lenses and dentures are not permitted into surgery (bring cases)      [x] Please do not wear any nail polish,

## 2022-08-06 ENCOUNTER — ANESTHESIA EVENT (OUTPATIENT)
Dept: OPERATING ROOM | Age: 27
End: 2022-08-06
Payer: MEDICAID

## 2022-08-07 NOTE — ANESTHESIA PRE PROCEDURE
  SECTION      x2 - last 2016     [place] NASAL/SINUS NDSC SURG W/DILATION FRONTAL SINUS N/A 2018    SINUS ENDOSCOPY FUNCTIONAL SURGERY, SUBMUCOSAL RESECTION INFERIOR TURBINATES, BILATERAL MAXILLARY, FRONTAL AND SPHENOID ANTROSTOMIES performed by Alejandra Beckwith DO at [facility] OR       Social History:    Social History     Tobacco Use    Smoking status: Never    Smokeless tobacco: Never   Substance Use Topics    Alcohol use: No                                Counseling given: Not Answered      Vital Signs (Current):   Vitals:    22 1013   Weight: 150 lb (68 kg)   Height: 5' 3\" (1.6 m)                                              BP Readings from Last 3 Encounters:   22 121/77   22 118/72   21 102/71       NPO Status:                                                                                 BMI:   Wt Readings from Last 3 Encounters:   22 140 lb (63.5 kg)   22 145 lb (65.8 kg)   21 155 lb 12.8 oz (70.7 kg)     Body mass index is 26.57 kg/m².     CBC:   Lab Results   Component Value Date/Time    WBC 8.4 2018 04:51 PM    RBC 4.79 2018 04:51 PM    HGB 13.3 2018 04:51 PM    HCT 40.2 2018 04:51 PM    MCV 83.9 2018 04:51 PM    RDW 13.2 2018 04:51 PM     2018 04:51 PM       CMP:   Lab Results   Component Value Date/Time     2018 04:51 PM    K 4.0 2018 04:51 PM     2018 04:51 PM    CO2 24 2018 04:51 PM    BUN 14 2018 04:51 PM    CREATININE 0.7 2018 04:51 PM    GFRAA >60 2018 04:51 PM    LABGLOM >60 2018 04:51 PM    GLUCOSE 130 2018 04:51 PM    PROT 7.2 2018 04:51 PM    CALCIUM 9.6 2018 04:51 PM    BILITOT 0.2 2018 04:51 PM    ALKPHOS 72 2018 04:51 PM    AST 13 2018 04:51 PM    ALT 7 2018 04:51 PM       POC Tests: No results for input(s): POCGLU, POCNA, POCK, POCCL, POCBUN, POCHEMO, POCHCT in the last 72 hours.    Coags:   Lab Results   Component Value Date/Time    PROTIME 11.6 01/30/2011 08:35 AM    INR 1.1 01/30/2011 08:35 AM    APTT 21.2 01/30/2011 08:35 AM       HCG (If Applicable):   Lab Results   Component Value Date    PREGTESTUR NEGATIVE 07/16/2018        ABGs: No results found for: PHART, PO2ART, AQC5CHO, MAE0NRD, BEART, F9YKZZPC     Type & Screen (If Applicable):  No results found for: LABABO, LABRH    Drug/Infectious Status (If Applicable):  No results found for: HIV, HEPCAB    COVID-19 Screening (If Applicable): No results found for: COVID19        Anesthesia Evaluation    Airway: Mallampati: I  TM distance: >3 FB   Neck ROM: full  Mouth opening: > = 3 FB   Dental: normal exam         Pulmonary:Negative Pulmonary ROS and normal exam                               Cardiovascular:Negative CV ROS                      Neuro/Psych:   (+) psychiatric history:            GI/Hepatic/Renal: Neg GI/Hepatic/Renal ROS            Endo/Other: Negative Endo/Other ROS                    Abdominal:             Vascular: Other Findings:           Anesthesia Plan      general     ASA 1       Induction: intravenous. continuous noninvasive hemodynamic monitor    Anesthetic plan and risks discussed with patient.         Attending anesthesiologist reviewed and agrees with Jay Chew MD   8/7/2022

## 2022-08-08 ENCOUNTER — HOSPITAL ENCOUNTER (OUTPATIENT)
Age: 27
Setting detail: OUTPATIENT SURGERY
Discharge: HOME | End: 2022-08-08
Attending: OTOLARYNGOLOGY | Admitting: OTOLARYNGOLOGY
Payer: MEDICAID

## 2022-08-08 ENCOUNTER — ANESTHESIA (OUTPATIENT)
Dept: OPERATING ROOM | Age: 27
End: 2022-08-08
Payer: MEDICAID

## 2022-08-08 VITALS
SYSTOLIC BLOOD PRESSURE: 133 MMHG | WEIGHT: 150 LBS | HEIGHT: 63 IN | RESPIRATION RATE: 16 BRPM | TEMPERATURE: 97.9 F | OXYGEN SATURATION: 95 % | HEART RATE: 79 BPM | DIASTOLIC BLOOD PRESSURE: 69 MMHG | BODY MASS INDEX: 26.58 KG/M2

## 2022-08-08 DIAGNOSIS — G89.18 POST-OP PAIN: Primary | ICD-10-CM

## 2022-08-08 DIAGNOSIS — J32.9 CHRONIC SINUSITIS, UNSPECIFIED LOCATION: ICD-10-CM

## 2022-08-08 LAB
HCG, URINE, POC: NEGATIVE
Lab: NORMAL
NEGATIVE QC PASS/FAIL: NORMAL
POSITIVE QC PASS/FAIL: NORMAL

## 2022-08-08 PROCEDURE — 87075 CULTR BACTERIA EXCEPT BLOOD: CPT

## 2022-08-08 PROCEDURE — 7100000011 HC PHASE II RECOVERY - ADDTL 15 MIN: Performed by: OTOLARYNGOLOGY

## 2022-08-08 PROCEDURE — 2500000003 HC RX 250 WO HCPCS: Performed by: NURSE ANESTHETIST, CERTIFIED REGISTERED

## 2022-08-08 PROCEDURE — 88304 TISSUE EXAM BY PATHOLOGIST: CPT

## 2022-08-08 PROCEDURE — 2500000003 HC RX 250 WO HCPCS: Performed by: OTOLARYNGOLOGY

## 2022-08-08 PROCEDURE — 3700000000 HC ANESTHESIA ATTENDED CARE: Performed by: OTOLARYNGOLOGY

## 2022-08-08 PROCEDURE — C2625 STENT, NON-COR, TEM W/DEL SY: HCPCS | Performed by: OTOLARYNGOLOGY

## 2022-08-08 PROCEDURE — 3700000001 HC ADD 15 MINUTES (ANESTHESIA): Performed by: OTOLARYNGOLOGY

## 2022-08-08 PROCEDURE — 6370000000 HC RX 637 (ALT 250 FOR IP): Performed by: OTOLARYNGOLOGY

## 2022-08-08 PROCEDURE — 6370000000 HC RX 637 (ALT 250 FOR IP)

## 2022-08-08 PROCEDURE — 2580000003 HC RX 258: Performed by: NURSE ANESTHETIST, CERTIFIED REGISTERED

## 2022-08-08 PROCEDURE — 6370000000 HC RX 637 (ALT 250 FOR IP): Performed by: STUDENT IN AN ORGANIZED HEALTH CARE EDUCATION/TRAINING PROGRAM

## 2022-08-08 PROCEDURE — 87205 SMEAR GRAM STAIN: CPT

## 2022-08-08 PROCEDURE — 3600000013 HC SURGERY LEVEL 3 ADDTL 15MIN: Performed by: OTOLARYNGOLOGY

## 2022-08-08 PROCEDURE — C1894 INTRO/SHEATH, NON-LASER: HCPCS | Performed by: OTOLARYNGOLOGY

## 2022-08-08 PROCEDURE — 7100000000 HC PACU RECOVERY - FIRST 15 MIN: Performed by: OTOLARYNGOLOGY

## 2022-08-08 PROCEDURE — 7100000010 HC PHASE II RECOVERY - FIRST 15 MIN: Performed by: OTOLARYNGOLOGY

## 2022-08-08 PROCEDURE — 6360000002 HC RX W HCPCS: Performed by: ANESTHESIOLOGY

## 2022-08-08 PROCEDURE — 6360000002 HC RX W HCPCS

## 2022-08-08 PROCEDURE — 87077 CULTURE AEROBIC IDENTIFY: CPT

## 2022-08-08 PROCEDURE — 2709999900 HC NON-CHARGEABLE SUPPLY: Performed by: OTOLARYNGOLOGY

## 2022-08-08 PROCEDURE — 2720000010 HC SURG SUPPLY STERILE: Performed by: OTOLARYNGOLOGY

## 2022-08-08 PROCEDURE — C1726 CATH, BAL DIL, NON-VASCULAR: HCPCS | Performed by: OTOLARYNGOLOGY

## 2022-08-08 PROCEDURE — 87070 CULTURE OTHR SPECIMN AEROBIC: CPT

## 2022-08-08 PROCEDURE — 31267 ENDOSCOPY MAXILLARY SINUS: CPT | Performed by: OTOLARYNGOLOGY

## 2022-08-08 PROCEDURE — 3600000003 HC SURGERY LEVEL 3 BASE: Performed by: OTOLARYNGOLOGY

## 2022-08-08 PROCEDURE — 7100000001 HC PACU RECOVERY - ADDTL 15 MIN: Performed by: OTOLARYNGOLOGY

## 2022-08-08 PROCEDURE — 6360000002 HC RX W HCPCS: Performed by: NURSE ANESTHETIST, CERTIFIED REGISTERED

## 2022-08-08 PROCEDURE — 87186 SC STD MICRODIL/AGAR DIL: CPT

## 2022-08-08 DEVICE — PROPEL CONTOUR SINUS IMPLANT
Type: IMPLANTABLE DEVICE | Site: NOSE | Status: FUNCTIONAL
Brand: PROPEL CONTOUR

## 2022-08-08 RX ORDER — ONDANSETRON 4 MG/1
4 TABLET, ORALLY DISINTEGRATING ORAL EVERY 8 HOURS PRN
Qty: 9 TABLET | Refills: 0 | Status: SHIPPED | OUTPATIENT
Start: 2022-08-08 | End: 2022-08-11

## 2022-08-08 RX ORDER — SODIUM CHLORIDE 0.9 % (FLUSH) 0.9 %
5-40 SYRINGE (ML) INJECTION PRN
Status: DISCONTINUED | OUTPATIENT
Start: 2022-08-08 | End: 2022-08-08 | Stop reason: HOSPADM

## 2022-08-08 RX ORDER — LIDOCAINE HYDROCHLORIDE AND EPINEPHRINE 10; 10 MG/ML; UG/ML
INJECTION, SOLUTION INFILTRATION; PERINEURAL PRN
Status: DISCONTINUED | OUTPATIENT
Start: 2022-08-08 | End: 2022-08-08 | Stop reason: ALTCHOICE

## 2022-08-08 RX ORDER — FENTANYL CITRATE 50 UG/ML
25 INJECTION, SOLUTION INTRAMUSCULAR; INTRAVENOUS EVERY 5 MIN PRN
Status: DISCONTINUED | OUTPATIENT
Start: 2022-08-08 | End: 2022-08-08 | Stop reason: HOSPADM

## 2022-08-08 RX ORDER — SODIUM CHLORIDE 0.9 % (FLUSH) 0.9 %
5-40 SYRINGE (ML) INJECTION EVERY 12 HOURS SCHEDULED
Status: DISCONTINUED | OUTPATIENT
Start: 2022-08-08 | End: 2022-08-08 | Stop reason: HOSPADM

## 2022-08-08 RX ORDER — SODIUM CHLORIDE 9 MG/ML
INJECTION, SOLUTION INTRAVENOUS PRN
Status: DISCONTINUED | OUTPATIENT
Start: 2022-08-08 | End: 2022-08-08 | Stop reason: HOSPADM

## 2022-08-08 RX ORDER — OXYMETAZOLINE HYDROCHLORIDE 0.05 G/100ML
SPRAY NASAL PRN
Status: DISCONTINUED | OUTPATIENT
Start: 2022-08-08 | End: 2022-08-08 | Stop reason: ALTCHOICE

## 2022-08-08 RX ORDER — HYDROCODONE BITARTRATE AND ACETAMINOPHEN 5; 325 MG/1; MG/1
1 TABLET ORAL ONCE
Status: COMPLETED | OUTPATIENT
Start: 2022-08-08 | End: 2022-08-08

## 2022-08-08 RX ORDER — ONDANSETRON 2 MG/ML
INJECTION INTRAMUSCULAR; INTRAVENOUS PRN
Status: DISCONTINUED | OUTPATIENT
Start: 2022-08-08 | End: 2022-08-08 | Stop reason: SDUPTHER

## 2022-08-08 RX ORDER — MIDAZOLAM HYDROCHLORIDE 1 MG/ML
INJECTION INTRAMUSCULAR; INTRAVENOUS PRN
Status: DISCONTINUED | OUTPATIENT
Start: 2022-08-08 | End: 2022-08-08 | Stop reason: SDUPTHER

## 2022-08-08 RX ORDER — LIDOCAINE HYDROCHLORIDE 10 MG/ML
INJECTION, SOLUTION EPIDURAL; INFILTRATION; INTRACAUDAL; PERINEURAL PRN
Status: DISCONTINUED | OUTPATIENT
Start: 2022-08-08 | End: 2022-08-08 | Stop reason: SDUPTHER

## 2022-08-08 RX ORDER — ONDANSETRON 2 MG/ML
4 INJECTION INTRAMUSCULAR; INTRAVENOUS
Status: COMPLETED | OUTPATIENT
Start: 2022-08-08 | End: 2022-08-08

## 2022-08-08 RX ORDER — PROPOFOL 10 MG/ML
INJECTION, EMULSION INTRAVENOUS PRN
Status: DISCONTINUED | OUTPATIENT
Start: 2022-08-08 | End: 2022-08-08 | Stop reason: SDUPTHER

## 2022-08-08 RX ORDER — HYDROCODONE BITARTRATE AND ACETAMINOPHEN 5; 325 MG/1; MG/1
1 TABLET ORAL EVERY 6 HOURS PRN
Qty: 12 TABLET | Refills: 0 | Status: SHIPPED | OUTPATIENT
Start: 2022-08-08 | End: 2022-08-11

## 2022-08-08 RX ORDER — SODIUM CHLORIDE 9 MG/ML
INJECTION, SOLUTION INTRAVENOUS CONTINUOUS PRN
Status: DISCONTINUED | OUTPATIENT
Start: 2022-08-08 | End: 2022-08-08 | Stop reason: SDUPTHER

## 2022-08-08 RX ORDER — FENTANYL CITRATE 50 UG/ML
INJECTION, SOLUTION INTRAMUSCULAR; INTRAVENOUS
Status: COMPLETED
Start: 2022-08-08 | End: 2022-08-08

## 2022-08-08 RX ORDER — ROCURONIUM BROMIDE 10 MG/ML
INJECTION, SOLUTION INTRAVENOUS PRN
Status: DISCONTINUED | OUTPATIENT
Start: 2022-08-08 | End: 2022-08-08 | Stop reason: SDUPTHER

## 2022-08-08 RX ORDER — HYDROCODONE BITARTRATE AND ACETAMINOPHEN 5; 325 MG/1; MG/1
TABLET ORAL
Status: COMPLETED
Start: 2022-08-08 | End: 2022-08-08

## 2022-08-08 RX ORDER — DEXAMETHASONE SODIUM PHOSPHATE 4 MG/ML
INJECTION, SOLUTION INTRA-ARTICULAR; INTRALESIONAL; INTRAMUSCULAR; INTRAVENOUS; SOFT TISSUE PRN
Status: DISCONTINUED | OUTPATIENT
Start: 2022-08-08 | End: 2022-08-08 | Stop reason: SDUPTHER

## 2022-08-08 RX ORDER — GLYCOPYRROLATE 0.2 MG/ML
INJECTION INTRAMUSCULAR; INTRAVENOUS PRN
Status: DISCONTINUED | OUTPATIENT
Start: 2022-08-08 | End: 2022-08-08 | Stop reason: SDUPTHER

## 2022-08-08 RX ORDER — FENTANYL CITRATE 50 UG/ML
INJECTION, SOLUTION INTRAMUSCULAR; INTRAVENOUS PRN
Status: DISCONTINUED | OUTPATIENT
Start: 2022-08-08 | End: 2022-08-08 | Stop reason: SDUPTHER

## 2022-08-08 RX ORDER — FENTANYL CITRATE 50 UG/ML
50 INJECTION, SOLUTION INTRAMUSCULAR; INTRAVENOUS EVERY 5 MIN PRN
Status: DISCONTINUED | OUTPATIENT
Start: 2022-08-08 | End: 2022-08-08 | Stop reason: HOSPADM

## 2022-08-08 RX ORDER — NEOSTIGMINE METHYLSULFATE 1 MG/ML
INJECTION, SOLUTION INTRAVENOUS PRN
Status: DISCONTINUED | OUTPATIENT
Start: 2022-08-08 | End: 2022-08-08 | Stop reason: SDUPTHER

## 2022-08-08 RX ORDER — OXYMETAZOLINE HYDROCHLORIDE 0.05 G/100ML
2 SPRAY NASAL
Status: COMPLETED | OUTPATIENT
Start: 2022-08-08 | End: 2022-08-08

## 2022-08-08 RX ADMIN — ROCURONIUM BROMIDE 40 MG: 10 INJECTION, SOLUTION INTRAVENOUS at 14:29

## 2022-08-08 RX ADMIN — MIDAZOLAM 2 MG: 1 INJECTION INTRAMUSCULAR; INTRAVENOUS at 14:21

## 2022-08-08 RX ADMIN — SODIUM CHLORIDE: 9 INJECTION, SOLUTION INTRAVENOUS at 14:21

## 2022-08-08 RX ADMIN — ONDANSETRON 4 MG: 2 INJECTION INTRAMUSCULAR; INTRAVENOUS at 16:30

## 2022-08-08 RX ADMIN — FENTANYL CITRATE 100 MCG: 50 INJECTION, SOLUTION INTRAMUSCULAR; INTRAVENOUS at 14:29

## 2022-08-08 RX ADMIN — GLYCOPYRROLATE 0.6 MG: 0.2 INJECTION INTRAMUSCULAR; INTRAVENOUS at 15:31

## 2022-08-08 RX ADMIN — PROPOFOL 200 MG: 10 INJECTION, EMULSION INTRAVENOUS at 14:29

## 2022-08-08 RX ADMIN — FENTANYL CITRATE 25 MCG: 50 INJECTION, SOLUTION INTRAMUSCULAR; INTRAVENOUS at 15:59

## 2022-08-08 RX ADMIN — HYDROCODONE BITARTRATE AND ACETAMINOPHEN 1 TABLET: 5; 325 TABLET ORAL at 16:30

## 2022-08-08 RX ADMIN — LIDOCAINE HYDROCHLORIDE 20 MG: 10 INJECTION, SOLUTION EPIDURAL; INFILTRATION; INTRACAUDAL; PERINEURAL at 14:29

## 2022-08-08 RX ADMIN — Medication 3 MG: at 15:31

## 2022-08-08 RX ADMIN — DEXAMETHASONE SODIUM PHOSPHATE 10 MG: 4 INJECTION, SOLUTION INTRAMUSCULAR; INTRAVENOUS at 14:29

## 2022-08-08 RX ADMIN — NASAL DECONGESTANT 2 SPRAY: 0.05 SPRAY NASAL at 13:15

## 2022-08-08 RX ADMIN — ONDANSETRON 4 MG: 2 INJECTION INTRAMUSCULAR; INTRAVENOUS at 15:31

## 2022-08-08 ASSESSMENT — PAIN DESCRIPTION - ORIENTATION: ORIENTATION: MID;INNER

## 2022-08-08 ASSESSMENT — PAIN DESCRIPTION - LOCATION
LOCATION: NOSE;INCISION
LOCATION: NOSE

## 2022-08-08 ASSESSMENT — PAIN DESCRIPTION - FREQUENCY
FREQUENCY: CONTINUOUS
FREQUENCY: CONTINUOUS

## 2022-08-08 ASSESSMENT — PAIN DESCRIPTION - PAIN TYPE
TYPE: SURGICAL PAIN
TYPE: SURGICAL PAIN

## 2022-08-08 ASSESSMENT — PAIN DESCRIPTION - ONSET
ONSET: PROGRESSIVE
ONSET: ON-GOING

## 2022-08-08 ASSESSMENT — PAIN DESCRIPTION - DESCRIPTORS
DESCRIPTORS: BURNING;DISCOMFORT
DESCRIPTORS: DISCOMFORT

## 2022-08-08 ASSESSMENT — PAIN SCALES - GENERAL
PAINLEVEL_OUTOF10: 7
PAINLEVEL_OUTOF10: 7

## 2022-08-08 NOTE — ANESTHESIA POSTPROCEDURE EVALUATION
Department of Anesthesiology  Postprocedure Note    Patient: Devin German  MRN: [id number]  YOB: 1995  Date of evaluation: 8/8/2022      Procedure Summary     Date: 08/08/22 Room / Location: [facility] OR 01 / [facility]    Anesthesia Start: 8136 Anesthesia Stop: 1612    Procedure: REVISION FUNCTIONAL ENDOSCOPIC SINUS SURGERY SUBMUCOSAL RESECTION INFERIOR TURBINATES WITH NAVIGATION (Nose) Diagnosis:       Chronic sinusitis, unspecified location      (Chronic sinusitis, unspecified location [J32.9])    Surgeons: Ishmael Lawrence DO Responsible Provider: Chana Waldrop MD    Anesthesia Type: General ASA Status: 1          Anesthesia Type: General    Nelsy Phase I: Nelsy Score: 8    Nelsy Phase II:        Anesthesia Post Evaluation    Patient location during evaluation: PACU  Patient participation: complete - patient participated  Level of consciousness: awake and alert  Pain score: 2  Airway patency: patent  Nausea & Vomiting: no nausea and no vomiting  Complications: no  Cardiovascular status: blood pressure returned to baseline  Respiratory status: acceptable  Hydration status: euvolemic

## 2022-08-08 NOTE — H&P
Subjective:      Patient ID:  Oskar Guardian is a 32 y.o. female. HPI:     Pt returns for review of CT Sinus. There are not changes since last visit. Pt has been on fluticasone (Flonase) for 1 month(s) and is doing adequately        Past Medical History        Past Medical History:   Diagnosis Date    Anxiety      Chronic sinusitis       for OR 16-18    Seasonal allergies           Past Surgical History         Past Surgical History:   Procedure Laterality Date     SECTION         x2 - last     [place] NASAL/SINUS NDSC SURG W/DILATION FRONTAL SINUS N/A 2018     SINUS ENDOSCOPY FUNCTIONAL SURGERY, SUBMUCOSAL RESECTION INFERIOR TURBINATES, BILATERAL MAXILLARY, FRONTAL AND SPHENOID ANTROSTOMIES performed by Lauren Patel DO at [facility] OR         Family History         Family History   Problem Relation Age of Onset    Cancer Maternal Grandmother           lung    Cancer Paternal Grandmother           metastatic    Diabetes Paternal Grandfather           Social History   Social History            Socioeconomic History    Marital status: Single       Spouse name: None    Number of children: None    Years of education: None    Highest education level: None   Occupational History       Employer: NOT EMPLOYED    Occupation:    Tobacco Use    Smoking status: Never Smoker    Smokeless tobacco: Never Used   Vaping Use    Vaping Use: Never used   Substance and Sexual Activity    Alcohol use: No    Drug use: No    Sexual activity: Yes       Partners: Male   Other Topics Concern    None   Social History Narrative     ** Merged History Encounter **            Social Determinants of Health          Financial Resource Strain:    Difficulty of Paying Living Expenses: Not on file   Food Insecurity:    Worried About [address] in the Last Year: Not on file    Candis of Food in the Last Year: Not on file   Transportation Needs:    Lack of Transportation (Medical):  Not on file    Lack of Transportation (Non-Medical): Not on file   Physical Activity:    Days of Exercise per Week: Not on file    Minutes of Exercise per Session: Not on file   Stress:    Feeling of Stress : Not on file   Social Connections:    Frequency of Communication with Friends and Family: Not on file    Frequency of Social Gatherings with Friends and Family: Not on file    Attends Buddhism Services: Not on file    Active Member of Clubs or Organizations: Not on file    Attends Club or Organization Meetings: Not on file    Marital Status: Not on file   Intimate Partner Violence:    Fear of Current or Ex-Partner: Not on file    Emotionally Abused: Not on file    Physically Abused: Not on file    Sexually Abused: Not on file   Housing Stability:    Unable to Pay for Housing in the Last Year: Not on file    Number of [place] in the Last Year: Not on file    Unstable Housing in the Last Year: Not on file              Allergies   Allergen Reactions    Bactrim [Sulfamethoxazole-Trimethoprim] Rash         Review of Systems  Constitutional: Negative for chills, fatigue and fever. HENT: Positive for congestion, postnasal drip, rhinorrhea, sinus pressure and sinus pain. Eyes: Negative for discharge and redness. Respiratory: Negative for cough, shortness of breath and stridor. Gastrointestinal: Negative for nausea and vomiting. Endocrine: Negative. Genitourinary: Negative. Musculoskeletal: Negative. Skin: Negative for color change and rash. Allergic/Immunologic: Negative. Neurological: Negative for dizziness, speech difficulty and headaches. Hematological: Negative. Psychiatric/Behavioral: Negative for agitation and confusion. All other systems reviewed and are negative. Objective:          Vitals:     05/13/22 1126   BP: 121/77   Pulse: (!) 43   Temp: 97.9 °F (36.6 °C)   SpO2: 99%      Physical Exam  Constitutional:       Appearance: Normal appearance.    HENT:     Head: Normocephalic and atraumatic. Right Ear: External ear normal.      Left Ear: External ear normal.      Nose: Congestion present. Mouth/Throat:      Mouth: Mucous membranes are moist.   Eyes:     Pupils: Pupils are equal, round, and reactive to light. Cardiovascular:     Rate and Rhythm: Normal rate. Musculoskeletal:      Cervical back: Normal range of motion. Skin:     General: Skin is warm and dry. Neurological:     Mental Status: She is alert. CT sinuses (my review)     yes - jeremy cells - bilateral  no - jocelyne bullosa -  yes - Enlarged inferior turbinates - bilateral   yes - Obstructed OMC -left   yes - maxillary sinus disease -left, severe  no - frontal sinus disease- ,  Left frontal sinus              Absent -  no              Size - Large  Right frontal sinus              Absent -  no              Size - Large  no - sphenoid sinus disease - ,  no - Deviated nasal septum - ,     no - Septal Spur - ,  no - posterior accessory os -,        Impression   Bilateral maxillary sinusitis, left greater than right. Assessment:        Diagnosis Orders   1. Chronic sinusitis, unspecified location     2. Hypertrophy of both inferior nasal turbinates     3. S/P FESS (functional endoscopic sinus surgery)                             Plan:      I recommend:     Revision Functional endoscopic sinus surgery with bilateral maxillary, frontal and sphenoid antrostomies, submucous resection of inferior turbinates. The procedure risks and benefits were discussed with the patient and family. Pt and family understood and decided to proceed with the surgery.      Surgical risks include:    --Injury to the lining of the brain, meningitis, stroke and death - most common in the approach to the frontal sinus but may also occur in operations on the sphenoid and ethmoid sinuses  --Injury to the carotid artery - most common in sphenoid sinus surgery  --Injury to the orbit, eye and eye muscles. Most commonly the medial rectus muscle is injured, this can cause double vision. The optic nerve can be injured during sphenoid sinus surgery. Injury to the Nasolacrimal Duct can cause tearing. -- Bleeding or air in the orbit. If this happens, blindness can occur due to pressure. Relief of the pressure is mandatory to prevent loss of sight.            Follow up 1 week after surgery    Electronically signed by Chester Jolley DO on 8/7/2022 at 8:37 PM

## 2022-08-08 NOTE — DISCHARGE INSTRUCTIONS
Follow up with Dr. Tricia Roldan in 7 days [phone]     Open Mouth and cover when sneezing, coughing  No nose blowing for 2 weeks  Nasal saline spray in each nostril 4-5 times a day  Take medicines as directed  Ice to back of neck for comfort  Sleep with head elevated 30 degrees for the next few days      Endoscopic Sinus Surgery: What to Expect at [address] will have a drip pad under your nose to collect mucus and blood. Change it only when it bleeds through. You may have to do this every hour for 24 hours after surgery. You may have some swelling of your nose, upper lip, cheeks, or around your eyes. Your nose may be sore and will bleed. You may feel \"stuffed up\" like you have a bad head cold. This will last for several days after surgery. The tip of your nose and your upper lip and gums may be numb. Feeling will return in a few weeks to a few months. Your sense of smell will not be as good after surgery. It will improve and probably return to normal in 1 to 2 months. You will probably be able to return to work or school in about 1 week and to your normal routine in about 3 weeks. However, this varies with your job and the extent of your surgery. Most people feel normal in 1 to 2 months. You will have to visit your doctor regularly for 3 to 4 months after your surgery. Your doctor will check to see that your sinuses are healing well. This care sheet gives you a general idea about how long it will take for you to recover. But each person recovers at a different pace. Follow the steps below to get better as quickly as possible. How can you care for yourself at home? Activity  Rest when you feel tired. Getting enough sleep will help you recover. Do not lie flat. Raise your head with three or four pillows. This can reduce swelling. Try to sleep on your back during the month after surgery. You can also sleep in a reclining chair. Try to walk each day.  Start by walking a little more than you did the day before. Bit by bit, increase the amount you walk. Walking boosts blood flow and helps prevent pneumonia and constipation. Also, try to sit and stand as much as you can. For 1 week, try not to bend over or lift anything heavier than 10 pounds. This may include a child, heavy grocery bags and milk containers, a heavy briefcase or backpack, cat litter or dog food bags, or a vacuum . You can take a shower or bath. Use lukewarm, not hot water. Avoid swimming for 6 weeks. Avoid sawdust, chemicals, and excessive dust for 4 weeks. Avoid strenuous activities, such as biking, jogging, weight lifting, or aerobic exercise, for 1 week and then ease back into these activities over 2 to 3 weeks. You may drive when you are no longer taking prescription pain medicine and feel up to it. You will probably be able to return to work or school in about 1 week and to your normal routine in about 3 weeks. However, this varies with your job and the extent of your surgery. Diet  You can eat your normal diet. If your stomach is upset, try bland, low-fat foods like plain rice, broiled chicken, toast, and yogurt. You may notice that your bowel movements are not regular right after your surgery. This is common. Try to avoid constipation and straining with bowel movements. You may want to take a fiber supplement every day. If you have not had a bowel movement after a couple of days, ask your doctor about taking a mild laxative. Medicines  Do not take aspirin, aspirin-containing medicines, or anti-inflammatory medicines such as ibuprofen (Advil, Motrin) or naproxen (Aleve) for 3 weeks following surgery unless your doctor says it is okay. Take pain medicines exactly as directed. If the doctor gave you a prescription medicine for pain, take it as prescribed. Do not take two or more pain medicines at the same time unless the doctor told you to. Many pain medicines have acetaminophen, which is Tylenol.  Too much acetaminophen (Tylenol) can be harmful. If your doctor prescribed antibiotics, take them as directed. Do not stop taking them just because you feel better. You need to take the full course of antibiotics. If you think your pain medicine is making you sick to your stomach: Take your medicine after meals (unless your doctor has told you not to). Ask your doctor for a different pain medicine. Incision care  You probably will not have an incision (cut). You will have a drip pad under your nose to collect blood. Change it only when it has bled through. You may have to do this every hour for 24 hours after surgery. When bleeding stops, you can remove it. If you have packing in your nose, leave it in. Your doctor will take it out. Ice and elevation  To help with swelling and pain, put ice or a cold pack on your nose for 10 to 20 minutes at a time. Put a thin cloth between the ice and your skin. Do not sleep flat. Sleep with your head raised up. You can also sleep in a reclining chair. Other instructions  Do not blow your nose for 2 weeks. Do not put anything into your nose. If you must sneeze, open your mouth and sneeze naturally. Keep your mouth clean. Rinse your mouth with salt water or an alcohol-free mouthwash after each meal and before bedtime. After any packing is removed, use saline (saltwater) nasal washes to help keep your nasal passages open and wash out mucus and bacteria. You can buy saline nose drops at a grocery store or drugstore. You can wear your glasses when you wish. Do not wear contacts until the day after the surgery. Do not travel by airplane for at least 2 weeks. Your sinuses are still healing, and the changes in air pressure can affect them. Follow-up care is a key part of your treatment and safety. Be sure to make and go to all appointments, and call your doctor if you are having problems. It's also a good idea to know your test results and keep a list of the medicines you take.   When should you call for help? Call 911 anytime you think you may need emergency care. For example, call if:  You passed out (lost consciousness). You have severe trouble breathing. Call your doctor now or seek immediate medical care if:  The dressing soaks through with blood and needs to be changed more than every 15 minutes. You have a fever with a stiff neck or a severe headache. You are sensitive to light, or you feel very sleepy or confused. You have pain that does not get better after you take pain medicine. You have a fever over 100°F.  You have double or blurred vision, cannot close your eyes, or have eye pain. Watch closely for changes in your health, and be sure to contact your doctor if:  You do not have a bowel movement after taking a laxative. Where can you learn more? Go to https://chpepiceweb.[email or web address]. org and sign in to your [employer] account. Enter N086 in the Pagido box to learn more about Endoscopic Sinus Surgery: What to Expect at Home.     If you do not have an account, please click on the Sign Up Now link. © 2409-6940 Healthwise, Incorporated. Care instructions adapted under license by [facility]. This care instruction is for use with your licensed healthcare professional. If you have questions about a medical condition or this instruction, always ask your healthcare professional. [address] any warranty or liability for your use of this information.   Content Version: 40.5.349850; Current as of: October 29, 2013

## 2022-08-08 NOTE — OP NOTE
Operative Note      Patient: Efra Zamudio  YOB: 1995  MRN: [id number]    Date of Procedure: 8/8/2022    Pre-Op Diagnosis: Chronic sinusitis, unspecified location [J32.9]    Post-Op Diagnosis:  left maxillary polyp       Procedure(s):  REVISION FUNCTIONAL ENDOSCOPIC SINUS SURGERY SUBMUCOSAL RESECTION INFERIOR TURBINATES WITH NAVIGATION    Surgeon(s):  Alessia Lindo DO    Assistant:   Resident: Haresh Mccann DO    Anesthesia: General    Estimated Blood Loss (mL): Minimal    Complications: None    Specimens:   ID Type Source Tests Collected by Time Destination   1 : LEFT MAXILLARY SINUS Tissue Maxillary Sinus CULTURE, ANAEROBIC, CULTURE, FUNGUS, GRAM STAIN, CULTURE, SURGICAL, CULTURE WITH SMEAR, ACID FAST BACILLIUS Alyssa Deng, DO 8/8/2022 1520    A :  LEFT MAXILLARY POLYP Tissue Tissue SURGICAL PATHOLOGY Alessia Lindo, DO 8/8/2022 1516        Implants:  Implant Name Type Inv. Item Serial No.  Lot No. LRB No. Used Action   IMPL SINUS RELEASE PROPEL CONTOUR - [id number] Face/Chin/Dental/Voice IMPL SINUS RELEASE PROPEL CONTOUR  [facility]SE[facility] [id number] N/A 1 Implanted   IMPL SINUS RELEASE PROPEL CONTOUR - [id number] Face/Chin/Dental/Voice IMPL SINUS RELEASE PROPEL CONTOUR  INTERSE[employer] [facility]PMM [id number] N/A 1 Implanted         Drains: * No LDAs found *    Findings: left maxillary polyp    Detailed Description of Procedure: Indications: Efra Zamudio is a 32 y.o. female who presents for sinus surgery due to chronic sinus illness with failure of medical management, radiographic evidence of chronic sinusitis    Prep  The patient was consented preoperatively and taken back to the operating room, identified appropriately, placed in the supine position, given anesthesia for general intubation. The patient was intubated. They were prepped and draped in a sterile fashion.  Initial prep for the nose was 4% cocaine pledgets placed into both nasal cavities and the patient's nasal walls medial and lateral along the septal line and then along the inferior turbinate were injected with approximately 10mL of 1% lidocaine with epinephrine. That was total for both sides. The pledgets were allowed to sit for approximately 5 minutes, while the rest of the patient's prep was done. Injection  The 30 degree endoscope was place into the left nostril along with a 10cc syringe with a spinal needle. The anterior root of the middle turbinate was injected with 2cc of 1% lidocaine with epinephrine. The right anterior root was also injected with the same amount. The posterior root of the middle turbinates were then addressed by injecting both sides with 2cc of 1% lidocain with epinephrine. Maxillary   LEFT:  With the maxillary sinuses, starting on the left side, the patient was not found to have an accessory os. The patient was not also found to have a jocelyne bullosa which was not reduced to allow access into the Osteomeatal complex. There was a polypoid mass appreciated to be coming from the natural ostium of the left maxillary sinus as seen in picture below. Ирина Depola forceps were used to grasp this mass which was removed en block. Marden Peer was then used to enter the ostium and remove tissue in a posterior to anterior direction along the floor of the maxillary wall until reaching the anterior maxillary line. A microdebrider was then used to remove the uncinate process so as to perform a wide maxillary antrostomy until we could see fully into the sinus which was cleared of debris with healthy appearing mucosa. A Propel contour implant was then placed into the maxillary antrostomy so as to stent it open and hold the middle turbinate medially. Attention was then turned to the right side. RIGHT:  With the maxillary sinuses, starting on the right side, the patient was found to have an accessory os.  The patient was not also found to have a jocelyne bullosa which was not reduced to

## 2022-08-08 NOTE — PROGRESS NOTES
CLINICAL PHARMACY NOTE: MEDS TO BEDS    Total # of Prescriptions Filled: 2   The following medications were delivered to the patient:  Norco 5/325  Ondansetron 4mg    Additional Documentation:

## 2022-08-08 NOTE — H&P
Avani Ramon was seen and re-examined preoperatively today, August 8, 2022. There was no substantial change in her physical and medical status. Patient is fit for the proposed surgical procedure. All questions were appropriately addressed and had no further questions regarding the risks, benefits, and alternatives of the procedure. Avani Ramon and family wished to proceed.     Anthony Marrero DO  Resident Physician  [employer] [facility]  Otolaryngology Residency  8/8/2022  1:47 PM

## 2022-08-09 ENCOUNTER — TELEPHONE (OUTPATIENT)
Dept: ENT CLINIC | Age: 27
End: 2022-08-09

## 2022-08-09 NOTE — TELEPHONE ENCOUNTER
MA spoke with patient, scheduled PO 8/16    Electronically signed by Gilmer Basilio MA on 8/9/22 at 9:04 AM EDT

## 2022-08-10 LAB — GRAM STAIN ORDERABLE: NORMAL

## 2022-08-11 LAB — ANAEROBIC CULTURE: NORMAL

## 2022-08-12 LAB
CULTURE SURGICAL: ABNORMAL
ORGANISM: ABNORMAL

## 2022-08-16 ENCOUNTER — OFFICE VISIT (OUTPATIENT)
Dept: ENT CLINIC | Age: 27
End: 2022-08-16

## 2022-08-16 VITALS — HEIGHT: 63 IN | BODY MASS INDEX: 26.58 KG/M2 | WEIGHT: 150 LBS

## 2022-08-16 DIAGNOSIS — Z98.890 S/P FESS (FUNCTIONAL ENDOSCOPIC SINUS SURGERY): Primary | ICD-10-CM

## 2022-08-16 DIAGNOSIS — J32.9 CHRONIC SINUSITIS, UNSPECIFIED LOCATION: ICD-10-CM

## 2022-08-16 DIAGNOSIS — J34.3 HYPERTROPHY OF BOTH INFERIOR NASAL TURBINATES: ICD-10-CM

## 2022-08-16 PROCEDURE — 99024 POSTOP FOLLOW-UP VISIT: CPT | Performed by: OTOLARYNGOLOGY

## 2022-08-16 ASSESSMENT — ENCOUNTER SYMPTOMS
EYE DISCHARGE: 0
SHORTNESS OF BREATH: 0
VOMITING: 0
BACK PAIN: 0
DIARRHEA: 0
SINUS PRESSURE: 0
COUGH: 0
ALLERGIC/IMMUNOLOGIC NEGATIVE: 1
EYE PAIN: 0
RHINORRHEA: 0
SINUS PAIN: 0
SORE THROAT: 0

## 2022-08-16 NOTE — PROGRESS NOTES
for pain and discharge. Respiratory:  Negative for cough and shortness of breath. Cardiovascular:  Negative for chest pain. Gastrointestinal:  Negative for diarrhea and vomiting. Genitourinary:  Negative for flank pain. Musculoskeletal:  Negative for back pain and neck pain. Skin:  Negative for rash. Allergic/Immunologic: Negative. Neurological:  Negative for syncope and headaches. All other systems reviewed and are negative. Objective: There were no vitals filed for this visit. Physical Exam  Vitals reviewed. Constitutional:       Appearance: Normal appearance. HENT:      Head: Normocephalic and atraumatic. Jaw: There is normal jaw occlusion. No tenderness. Right Ear: Tympanic membrane, ear canal and external ear normal.      Left Ear: Tympanic membrane, ear canal and external ear normal.      Nose: Nose normal.      Right Turbinates: Not swollen or pale. Left Turbinates: Not swollen or pale. Mouth/Throat:      Lips: Pink. Mouth: Mucous membranes are moist.      Pharynx: Oropharynx is clear. Eyes:      General: Lids are normal.      Conjunctiva/sclera: Conjunctivae normal.      Pupils: Pupils are equal, round, and reactive to light. Cardiovascular:      Rate and Rhythm: Normal rate and regular rhythm. Pulses: Normal pulses. Pulmonary:      Effort: Pulmonary effort is normal. No respiratory distress. Breath sounds: No stridor. Abdominal:      General: Abdomen is flat. Palpations: Abdomen is soft. Musculoskeletal:         General: Normal range of motion. Cervical back: Normal range of motion. No rigidity. Skin:     General: Skin is warm and dry. Neurological:      General: No focal deficit present. Mental Status: She is alert and oriented to person, place, and time.    Psychiatric:         Attention and Perception: Attention normal.         Mood and Affect: Affect normal.         Behavior: Behavior normal. Behavior is cooperative. Thought Content: Thought content normal.         Judgment: Judgment normal.     Surgical Pathology:  [address]   [address]      [address]            [address], [address] [place] [address]   [facility], [facility], [address]               FINAL SURGICAL PATHOLOGY REPORT     NAME:            Chadwick Quintana           Date of       08/08/2022                                            Collection:   Medical Record   [id number]              Date of       08/09/2022   Number:                                  Receipt:   Age:  32 Y        Sex:  F                Date          08/15/2022 13:16                                            Reported:   YOB: 1995   Financial        [id number]             Admitting     CATRINA   Number:                                  Physician:    Corey Huang [place]   Patient          DIS   [facility]           Ordering      CATRINA   Location:                                Physician:    Corey Huang [place]     Accession Number:  [id number]                                       Additional Physicians:AJITH MONTENEGRO       Diagnosis:   Left maxillary polyps: Inflammatory nasal polyps                                               Neftali Mcdonough M.D.                                        (Electronic Signature)         Specimen Submitted:     POLYPS, NASAL/SINUSOIDAL, LEFT MAXILLARY     Clinical Notes:  Operative procedure:  Revision functional endoscopic   sinus surgery, submucosal resection inferior turbinates with navigation. Preoperative Dx:   Chronic sinusitis. Microscopic Evaluation: Was performed.      Gross Description:   Submitted in one part in formalin, labeled \"Avani Ramon, left maxillary polyps\" are 2 irregularly-shaped portions of   tan-gray/semi-translucent fibromembranous and focally edematous soft   tissue, which measures 1.2 x 0.3 x 0.2 cm and 2.5 x 1.4 x 0.3 cm. Entirely submitted as received. Block label A1. (ARI:BEHSU)     CODES:    [id number];                                  Department of Pathology      Page 1 of 1      Specimen Collected: 08/08/22 00:00 EDT Last Resulted: 08/15/22 13:16 EDT          Assessment:       Diagnosis Orders   1. S/P FESS (functional endoscopic sinus surgery)        2. Hypertrophy of both inferior nasal turbinates        3. Chronic sinusitis, unspecified location                   Plan:   Patient was seen and examined today s/p FESS and SMRITs. Patient states that she is doing very well he has had no complication postoperatively. At this time she was doing encouraged to continue nasal lavages daily for the next month to 6 weeks. She was cleared to resume exercise as tolerated. At this time she will follow-up in 6 months for reevaluation and possible rigid scope at her next appointment. She agrees to this plan of instructed to call for any new or worsening symptoms prior to her next appointment. Follow up in 6 month(s)        Avani Ramon  1995    I have discussed the case, including pertinent history and exam findings with the resident. I have seen and examined the patient and the key elements of the encounter have been performed by me. I agree with the assessment, plan and orders as documented by the  resident              Remainder of medical problems as per  resident note. Patient seen and examined. Agree with above exam, assessment and plan.       Electronically signed by Alejandra Beckwith DO on 8/17/22 at 11:40 AM EDT

## 2022-08-22 PROBLEM — J33.9 NASAL POLYPOSIS: Status: ACTIVE | Noted: 2022-08-22

## 2022-09-19 RX ORDER — FLUTICASONE PROPIONATE 50 MCG
SPRAY, SUSPENSION (ML) NASAL
Qty: 16 G | Refills: 5 | Status: SHIPPED | OUTPATIENT
Start: 2022-09-19

## 2023-01-30 RX ORDER — SUMATRIPTAN 25 MG/1
TABLET, FILM COATED ORAL
Qty: 27 TABLET | Refills: 0 | Status: SHIPPED | OUTPATIENT
Start: 2023-01-30

## 2023-02-03 DIAGNOSIS — F41.9 ANXIETY: ICD-10-CM

## 2023-02-03 RX ORDER — SERTRALINE HYDROCHLORIDE 100 MG/1
200 TABLET, FILM COATED ORAL DAILY
Qty: 180 TABLET | Refills: 3 | OUTPATIENT
Start: 2023-02-03

## 2023-02-03 RX ORDER — SERTRALINE HYDROCHLORIDE 100 MG/1
200 TABLET, FILM COATED ORAL DAILY
Qty: 180 TABLET | Refills: 3 | Status: SHIPPED | OUTPATIENT
Start: 2023-02-03

## 2023-02-13 ENCOUNTER — OFFICE VISIT (OUTPATIENT)
Dept: FAMILY MEDICINE CLINIC | Age: 28
End: 2023-02-13
Payer: MEDICAID

## 2023-02-13 VITALS
OXYGEN SATURATION: 98 % | HEART RATE: 53 BPM | RESPIRATION RATE: 16 BRPM | BODY MASS INDEX: 32.43 KG/M2 | TEMPERATURE: 97.7 F | DIASTOLIC BLOOD PRESSURE: 80 MMHG | HEIGHT: 63 IN | SYSTOLIC BLOOD PRESSURE: 117 MMHG | WEIGHT: 183 LBS

## 2023-02-13 DIAGNOSIS — F41.9 ANXIETY: ICD-10-CM

## 2023-02-13 DIAGNOSIS — Z00.00 ANNUAL PHYSICAL EXAM: Primary | ICD-10-CM

## 2023-02-13 PROCEDURE — 99395 PREV VISIT EST AGE 18-39: CPT | Performed by: FAMILY MEDICINE

## 2023-02-13 PROCEDURE — G8484 FLU IMMUNIZE NO ADMIN: HCPCS | Performed by: FAMILY MEDICINE

## 2023-02-13 SDOH — ECONOMIC STABILITY: FOOD INSECURITY: WITHIN THE PAST 12 MONTHS, THE FOOD YOU BOUGHT JUST DIDN'T LAST AND YOU DIDN'T HAVE MONEY TO GET MORE.: NEVER TRUE

## 2023-02-13 SDOH — ECONOMIC STABILITY: HOUSING INSECURITY
IN THE LAST 12 MONTHS, WAS THERE A TIME WHEN YOU DID NOT HAVE A STEADY PLACE TO SLEEP OR SLEPT IN A SHELTER (INCLUDING NOW)?: NO

## 2023-02-13 SDOH — ECONOMIC STABILITY: FOOD INSECURITY: WITHIN THE PAST 12 MONTHS, YOU WORRIED THAT YOUR FOOD WOULD RUN OUT BEFORE YOU GOT MONEY TO BUY MORE.: NEVER TRUE

## 2023-02-13 SDOH — ECONOMIC STABILITY: INCOME INSECURITY: HOW HARD IS IT FOR YOU TO PAY FOR THE VERY BASICS LIKE FOOD, HOUSING, MEDICAL CARE, AND HEATING?: NOT HARD AT ALL

## 2023-02-13 ASSESSMENT — LIFESTYLE VARIABLES
HOW OFTEN DO YOU HAVE A DRINK CONTAINING ALCOHOL: NEVER
HOW MANY STANDARD DRINKS CONTAINING ALCOHOL DO YOU HAVE ON A TYPICAL DAY: 1 OR 2

## 2023-02-13 ASSESSMENT — PATIENT HEALTH QUESTIONNAIRE - PHQ9
SUM OF ALL RESPONSES TO PHQ QUESTIONS 1-9: 7
8. MOVING OR SPEAKING SO SLOWLY THAT OTHER PEOPLE COULD HAVE NOTICED. OR THE OPPOSITE, BEING SO FIGETY OR RESTLESS THAT YOU HAVE BEEN MOVING AROUND A LOT MORE THAN USUAL: 0
SUM OF ALL RESPONSES TO PHQ QUESTIONS 1-9: 7
6. FEELING BAD ABOUT YOURSELF - OR THAT YOU ARE A FAILURE OR HAVE LET YOURSELF OR YOUR FAMILY DOWN: 1
3. TROUBLE FALLING OR STAYING ASLEEP: 0
2. FEELING DOWN, DEPRESSED OR HOPELESS: 1
5. POOR APPETITE OR OVEREATING: 3
SUM OF ALL RESPONSES TO PHQ9 QUESTIONS 1 & 2: 2
1. LITTLE INTEREST OR PLEASURE IN DOING THINGS: 1
4. FEELING TIRED OR HAVING LITTLE ENERGY: 1
10. IF YOU CHECKED OFF ANY PROBLEMS, HOW DIFFICULT HAVE THESE PROBLEMS MADE IT FOR YOU TO DO YOUR WORK, TAKE CARE OF THINGS AT HOME, OR GET ALONG WITH OTHER PEOPLE: 0
SUM OF ALL RESPONSES TO PHQ QUESTIONS 1-9: 7
SUM OF ALL RESPONSES TO PHQ QUESTIONS 1-9: 7
9. THOUGHTS THAT YOU WOULD BE BETTER OFF DEAD, OR OF HURTING YOURSELF: 0
7. TROUBLE CONCENTRATING ON THINGS, SUCH AS READING THE NEWSPAPER OR WATCHING TELEVISION: 0

## 2023-02-13 NOTE — PROGRESS NOTES
2/13/2023    Chief Complaint   Patient presents with    Annual Exam     Wants blood work       Screening Questions:   Exercise 30 minutes daily 5 times weekly  Pap smear UTD:  Yes   Eye exam every 2-4 years, yearly if diabetic:  Yes   Dental exam:  Yes   BMI: Body mass index is 32.42 kg/m². Jojo Molina Counseled on weight loss    Anxiety and/or Depression:  Patient is here with complaints of anxiety and/or depression. Patient does not have suicidal or homicidal ideation. Has little interest in doing activities. Patient is not having issues falling or staying asleep. Patient is not having associated panic attacks. Patient does not use alcohol and/or drugs. Zoloft- wants to discuss decreasing, gaining weight.  Prior to starting sertraline, felt heaviness on chest     Labs:  No results found for: EAG  No results found for: LDLCALC, LDLCHOLESTEROL, LDLDIRECT   CBC:   Lab Results   Component Value Date/Time    WBC 8.4 03/06/2018 04:51 PM    RBC 4.79 03/06/2018 04:51 PM    HGB 13.3 03/06/2018 04:51 PM    HCT 40.2 03/06/2018 04:51 PM    MCV 83.9 03/06/2018 04:51 PM    MCH 27.8 03/06/2018 04:51 PM    MCHC 33.1 03/06/2018 04:51 PM    RDW 13.2 03/06/2018 04:51 PM     03/06/2018 04:51 PM    MPV 8.6 03/06/2018 04:51 PM         /80   Pulse 53   Temp 97.7 °F (36.5 °C)   Resp 16   Ht 5' 3\" (1.6 m)   Wt 183 lb (83 kg)   SpO2 98%   BMI 32.42 kg/m²   Wt Readings from Last 3 Encounters:   02/13/23 183 lb (83 kg)   08/16/22 150 lb (68 kg)   08/04/22 150 lb (68 kg)     Temp Readings from Last 3 Encounters:   02/13/23 97.7 °F (36.5 °C)   08/08/22 97.9 °F (36.6 °C)   05/13/22 97.9 °F (36.6 °C) (Temporal)     BP Readings from Last 3 Encounters:   02/13/23 117/80   08/08/22 133/69   05/13/22 121/77     Pulse Readings from Last 3 Encounters:   02/13/23 53   08/08/22 79   05/13/22 (!) 43     Patient's past medical, surgical, social and/or family history reviewed, updated in chart, and are non-contributory (unless otherwise stated). Medications and allergies also reviewed and updated in chart. ROS  Review of Systems - General ROS: negative for - chills, fatigue, fever, night sweats, sleep disturbance, weight gain or weight loss  Psychological ROS: negative for - anxiety, behavioral disorder, depression, hallucinations, irritability, memory difficulties, mood swings, sleep disturbances or suicidal ideation  ENT ROS: negative for - epistaxis, headaches, hearing change, nasal congestion, nasal discharge, nasal polyps, sinus pain, tinnitus, vertigo or visual changes  Hematological and Lymphatic ROS: negative for - bleeding problems, blood clots, fatigue or swollen lymph nodes  Respiratory ROS: negative for - cough, orthopnea, shortness of breath, sputum changes, tachypnea or wheezing  Cardiovascular ROS: negative for - chest pain, dyspnea on exertion, irregular heartbeat, loss of consciousness, palpitations, paroxysmal nocturnal dyspnea or rapid heart rate  Gastrointestinal ROS: negative for - abdominal pain, blood in stools, change in bowel habits, constipation, diarrhea, gas/bloating, heartburn or nausea/vomiting  Musculoskeletal ROS: negative for - joint pain, joint stiffness, joint swelling or muscle, back pain, bowel or bladder incontinence  Neurological ROS: negative for - behavioral changes, confusion, dizziness, headaches, memory loss, numbness/tingling, seizures or speech problems, weakness  Dermatological ROS: negative for - dry skin, mole changes, nail changes, pruritus, rash or skin lesion changes    Patient's past medical, surgical, social and/or family history reviewed, updated in chart, and are non-contributory (unless otherwise stated). Medications and allergies also reviewed and updated in chart. Physical Exam:    General appearance: alert, well appearing, and in no distress, oriented to person, place, and time and normal appearing weight.     CVS exam: normal rate, regular rhythm, normal S1, S2, no murmurs, rubs, clicks or gallops. Radial pulses 2+ bilateral.  PT/DP pulse 2+ bilat. No C/C/E    Chest: clear to auscultation, no wheezes, rales or rhonchi, symmetric air entry. Musculoskeletal: MS 5/5, DTR 2/4 x4 extremities, FROM F/E spine, no tenderness, obvious masses or deformities. Gait normal.     Abdomen: Soft, non-tender, non-distended, positive BS in all 4 quadrants    Extremities:Dorsalis pedis pulses palpated bilaterally, no clubbing, cyanosis, edema or erythema     SKIN: no lesions, jaundice, petechiae, pallor, cyanosis, ecchymosis    NEURO: gross motor exam normal by observation, gait normal      Assessment/Plan  Avani was seen today for annual exam.    Diagnoses and all orders for this visit:    Annual physical exam  -     CBC; Future  -     Comprehensive Metabolic Panel; Future  -     Lipid Panel; Future  -     TSH; Future    Anxiety  - Wean to sertraline  to help to curb weight gain. Watch diet, exercise, review labs. Return in about 6 weeks (around 3/27/2023). Robyn Wu, DO    Call or go to ED immediately if symptoms worsen or persist.    Educational materials and/or home exercises printed for patient's review and were included in patient instructions on his/her After Visit Summary and given to patient at the end of visit. Counseled regarding above diagnosis, including possible risks and complications,  especially if left uncontrolled. Counseled regarding the possible side effects, risks, benefits and alternatives to treatment; patient and/or guardian verbalizes understanding, agrees, feels comfortable with and wishes to proceed with above treatment plan. Advised patient to call with any new medication issues, and read all Rx info from pharmacy to assure aware of all possible risks and side effects of medication before taking. Reviewed age and gender appropriate health screening exams and vaccinations.   Advised patient regarding importance of keeping up with recommended health maintenance and to schedule as soon as possible if overdue, as this is important in assessing for undiagnosed pathology, especially cancer, as well as protecting against potentially harmful/life threatening disease. Patient and/or guardian verbalizes understanding and agrees with above counseling, assessment and plan. All questions answered.

## 2023-05-01 PROBLEM — J34.3 HYPERTROPHY OF BOTH INFERIOR NASAL TURBINATES: Status: ACTIVE | Noted: 2021-01-28

## 2023-09-26 ENCOUNTER — OFFICE VISIT (OUTPATIENT)
Dept: FAMILY MEDICINE CLINIC | Age: 28
End: 2023-09-26
Payer: MEDICAID

## 2023-09-26 VITALS
TEMPERATURE: 97.4 F | BODY MASS INDEX: 29.7 KG/M2 | HEART RATE: 64 BPM | OXYGEN SATURATION: 98 % | DIASTOLIC BLOOD PRESSURE: 66 MMHG | HEIGHT: 63 IN | RESPIRATION RATE: 18 BRPM | WEIGHT: 167.6 LBS | SYSTOLIC BLOOD PRESSURE: 110 MMHG

## 2023-09-26 DIAGNOSIS — F32.A DEPRESSION, UNSPECIFIED DEPRESSION TYPE: ICD-10-CM

## 2023-09-26 DIAGNOSIS — F41.9 ANXIETY: Primary | ICD-10-CM

## 2023-09-26 PROCEDURE — 1036F TOBACCO NON-USER: CPT | Performed by: FAMILY MEDICINE

## 2023-09-26 PROCEDURE — 99214 OFFICE O/P EST MOD 30 MIN: CPT | Performed by: FAMILY MEDICINE

## 2023-09-26 PROCEDURE — G8417 CALC BMI ABV UP PARAM F/U: HCPCS | Performed by: FAMILY MEDICINE

## 2023-09-26 PROCEDURE — G8427 DOCREV CUR MEDS BY ELIG CLIN: HCPCS | Performed by: FAMILY MEDICINE

## 2023-09-26 RX ORDER — BUPROPION HYDROCHLORIDE 150 MG/1
150 TABLET ORAL EVERY MORNING
Qty: 30 TABLET | Refills: 3 | Status: SHIPPED | OUTPATIENT
Start: 2023-09-26

## 2023-09-26 NOTE — PATIENT INSTRUCTIONS
Take 100 mg daily for 5 days. Then, 50 mg daily for 5 days then 25 mg tablet for 5 days, tablet every other day for 5 days then stop. You can start new medication the following day.

## 2023-10-17 RX ORDER — LORATADINE 10 MG/1
TABLET ORAL
Qty: 30 TABLET | Refills: 5 | Status: SHIPPED | OUTPATIENT
Start: 2023-10-17

## 2023-10-17 RX ORDER — SUMATRIPTAN 25 MG/1
TABLET, FILM COATED ORAL
Qty: 27 TABLET | Refills: 0 | Status: SHIPPED | OUTPATIENT
Start: 2023-10-17

## 2024-01-17 DIAGNOSIS — F32.A DEPRESSION, UNSPECIFIED DEPRESSION TYPE: ICD-10-CM

## 2024-01-17 RX ORDER — BUPROPION HYDROCHLORIDE 150 MG/1
150 TABLET ORAL EVERY MORNING
Qty: 30 TABLET | Refills: 3 | Status: SHIPPED | OUTPATIENT
Start: 2024-01-17

## 2024-05-17 DIAGNOSIS — F32.A DEPRESSION, UNSPECIFIED DEPRESSION TYPE: ICD-10-CM

## 2024-05-17 RX ORDER — BUPROPION HYDROCHLORIDE 150 MG/1
150 TABLET ORAL EVERY MORNING
Qty: 30 TABLET | Refills: 3 | Status: SHIPPED
Start: 2024-05-17 | End: 2024-05-17 | Stop reason: SDUPTHER

## 2024-05-17 RX ORDER — BUPROPION HYDROCHLORIDE 150 MG/1
150 TABLET ORAL EVERY MORNING
Qty: 30 TABLET | Refills: 1 | Status: SHIPPED | OUTPATIENT
Start: 2024-05-17

## 2024-09-21 RX ORDER — AMOXICILLIN 875 MG
875 TABLET ORAL 2 TIMES DAILY
Qty: 20 TABLET | Refills: 0 | Status: SHIPPED | OUTPATIENT
Start: 2024-09-21 | End: 2024-10-01

## 2024-09-30 ENCOUNTER — OFFICE VISIT (OUTPATIENT)
Dept: FAMILY MEDICINE CLINIC | Age: 29
End: 2024-09-30
Payer: MEDICAID

## 2024-09-30 VITALS
HEIGHT: 63 IN | HEART RATE: 64 BPM | SYSTOLIC BLOOD PRESSURE: 120 MMHG | OXYGEN SATURATION: 98 % | TEMPERATURE: 98.1 F | RESPIRATION RATE: 16 BRPM | DIASTOLIC BLOOD PRESSURE: 70 MMHG | BODY MASS INDEX: 28 KG/M2 | WEIGHT: 158 LBS

## 2024-09-30 DIAGNOSIS — Z00.00 ENCOUNTER FOR WELL ADULT EXAM WITHOUT ABNORMAL FINDINGS: Primary | ICD-10-CM

## 2024-09-30 DIAGNOSIS — F32.A DEPRESSION, UNSPECIFIED DEPRESSION TYPE: ICD-10-CM

## 2024-09-30 PROCEDURE — 99395 PREV VISIT EST AGE 18-39: CPT | Performed by: FAMILY MEDICINE

## 2024-09-30 RX ORDER — BUPROPION HYDROCHLORIDE 150 MG/1
150 TABLET ORAL EVERY MORNING
Qty: 30 TABLET | Refills: 5 | Status: SHIPPED | OUTPATIENT
Start: 2024-09-30

## 2024-09-30 RX ORDER — SUMATRIPTAN 25 MG/1
25 TABLET, FILM COATED ORAL
Qty: 27 TABLET | Refills: 1 | Status: SHIPPED | OUTPATIENT
Start: 2024-09-30 | End: 2024-09-30

## 2024-09-30 SDOH — ECONOMIC STABILITY: FOOD INSECURITY: WITHIN THE PAST 12 MONTHS, THE FOOD YOU BOUGHT JUST DIDN'T LAST AND YOU DIDN'T HAVE MONEY TO GET MORE.: NEVER TRUE

## 2024-09-30 SDOH — ECONOMIC STABILITY: FOOD INSECURITY: WITHIN THE PAST 12 MONTHS, YOU WORRIED THAT YOUR FOOD WOULD RUN OUT BEFORE YOU GOT MONEY TO BUY MORE.: NEVER TRUE

## 2024-09-30 SDOH — ECONOMIC STABILITY: INCOME INSECURITY: HOW HARD IS IT FOR YOU TO PAY FOR THE VERY BASICS LIKE FOOD, HOUSING, MEDICAL CARE, AND HEATING?: NOT HARD AT ALL

## 2024-09-30 NOTE — PATIENT INSTRUCTIONS
all appointments, and call your doctor if you are having problems. It's also a good idea to know your test results and keep a list of the medicines you take.  Where can you learn more?  Go to [email or web address]://[email or web address]/patientEd and enter U807 to learn more about \"A Healthy Lifestyle: Care Instructions.\"  Current as of: August 6, 2023  Content Version: 14.1  © 2006-2024 [employer].   Care instructions adapted under license by [employer]. If you have questions about a medical condition or this instruction, always ask your healthcare professional. [employer] disclaims any warranty or liability for your use of this information.

## 2024-09-30 NOTE — PROGRESS NOTES
and/or guardian verbalizes understanding and agrees with above counseling, assessment and plan.    All questions answered.

## 2025-05-13 DIAGNOSIS — F32.A DEPRESSION, UNSPECIFIED DEPRESSION TYPE: ICD-10-CM

## 2025-05-13 RX ORDER — BUPROPION HYDROCHLORIDE 150 MG/1
150 TABLET ORAL EVERY MORNING
Qty: 30 TABLET | Refills: 5 | Status: SHIPPED | OUTPATIENT
Start: 2025-05-13

## 2025-06-09 RX ORDER — SUMATRIPTAN SUCCINATE 25 MG/1
TABLET ORAL
Qty: 27 TABLET | Refills: 1 | Status: SHIPPED | OUTPATIENT
Start: 2025-06-09

## (undated) DEVICE — CABLE NAVIGATION TRUDI

## (undated) DEVICE — CONTROL SYRINGE LUER-LOCK TIP: Brand: MONOJECT

## (undated) DEVICE — SET MAGNUM STR SHOT

## (undated) DEVICE — NEEDLE SPNL 22GA L3.5IN BLK HUB S STL REG WALL FIT STYL W/

## (undated) DEVICE — TRAY SINUS INST EXTRAS REUSABLE

## (undated) DEVICE — MARKER,SKIN,WI/RULER AND LABELS: Brand: MEDLINE

## (undated) DEVICE — SYRINGE MED 50ML LUERLOCK TIP

## (undated) DEVICE — SOLUTION IV IRRIG POUR BRL 0.9% SODIUM CHL 2F7124

## (undated) DEVICE — INTENDED FOR TISSUE SEPARATION, AND OTHER PROCEDURES THAT REQUIRE A SHARP SURGICAL BLADE TO PUNCTURE OR CUT.: Brand: BARD-PARKER ® STAINLESS STEEL BLADES

## (undated) DEVICE — CODMAN® SURGICAL PATTIES 1/2" X 3" (1.27CM X 7.62CM): Brand: CODMAN®

## (undated) DEVICE — COUNTER NDL 30 COUNT DBL MAG

## (undated) DEVICE — SOLUTION IV 500ML 0.9% SOD CHL PH 5 INJ USP VIAFLX PLAS

## (undated) DEVICE — SET SINUS ENDOSCOPY

## (undated) DEVICE — SURGICAL PROCEDURE PACK EENT CUST

## (undated) DEVICE — DOUBLE BASIN SET: Brand: MEDLINE INDUSTRIES, INC.

## (undated) DEVICE — TUBING 1912030 ENDO-SCRUB 2 5PK: Brand: ENDO-SCRUB®

## (undated) DEVICE — BALLOON SINUPLASTY 6X16 MM SYS RELIEVA SPINPLUS

## (undated) DEVICE — SET NASAL

## (undated) DEVICE — SHEATH 1912010 5PK 4MM/30DEG STORZ XOMED: Brand: ENDO-SCRUB®

## (undated) DEVICE — SOLUTION IV 1000ML 0.9% SOD CHL PH 5 INJ USP VIAFLX PLAS

## (undated) DEVICE — KIT,ANTI FOG,W/SPONGE & FLUID,SOFT PACK: Brand: MEDLINE

## (undated) DEVICE — NEEDLE FLTR 18GA L1.5IN MEM THK5UM BLNT DISP

## (undated) DEVICE — MAGNETIC INSTR DRAPE 20X16: Brand: MEDLINE INDUSTRIES, INC.

## (undated) DEVICE — TOWEL,OR,DSP,ST,BLUE,STD,6/PK,12PK/CS: Brand: MEDLINE

## (undated) DEVICE — RETAINER 16IN ADJ EAR LOOP DRSG NSL NS

## (undated) DEVICE — 4-PORT MANIFOLD: Brand: NEPTUNE 2

## (undated) DEVICE — CAMERA STRYKER 1488

## (undated) DEVICE — NEEDLE HYPO 25GA L1.5IN BLU POLYPR HUB S STL REG BVL STR

## (undated) DEVICE — INSTRUMENT CORD BALLOON SINUPLASTY REUSABLE

## (undated) DEVICE — SWAB SPEC COLL SHFT L5.25IN POLYUR FOAM TIP SFT DBL MEDIA

## (undated) DEVICE — TUBING, SUCTION, 3/16" X 12', STRAIGHT: Brand: MEDLINE

## (undated) DEVICE — STANDARD HYPODERMIC NEEDLE,ALUMINUM HUB: Brand: MONOJECT

## (undated) DEVICE — Device

## (undated) DEVICE — SET SINUS SCOPE

## (undated) DEVICE — PROBE TIP ANGLE 0 DEGREES

## (undated) DEVICE — MAGNETIC DRAPE WITH STAYS,ONE NEUTRAL ZONE: Brand: DEVON

## (undated) DEVICE — SYRINGE,CONTROL,LL,FINGER,GRIP: Brand: MEDLINE INDUSTRIES, INC.

## (undated) DEVICE — BLADE 1884380HR QUADCUT 5PK 4.3MM X 13CM: Brand: QUADCUT®

## (undated) DEVICE — DEVICE INFL PRSS G INDIC DISP (MUST BE PURC IN MULTIPLES OF 5)

## (undated) DEVICE — SYRINGE, LUER LOCK, 10ML: Brand: MEDLINE

## (undated) DEVICE — PLATE ES AD W 9FT CRD 2

## (undated) DEVICE — GOWN,SIRUS,FABRNF,XL,20/CS: Brand: MEDLINE

## (undated) DEVICE — LIGHT SOURCE WHT

## (undated) DEVICE — SPONGE GZ W4XL4IN RAYON POLY FILL CVR W/ NONWOVEN FAB

## (undated) DEVICE — CONTAINER VACUTAINER ANAER CULTURE SWAB

## (undated) DEVICE — REFLEX ULTRA 45 WITH INTEGRATED CABLE: Brand: COBLATION

## (undated) DEVICE — GLOVE ORANGE PI 8 1/2   MSG9085